# Patient Record
Sex: MALE | Race: WHITE | NOT HISPANIC OR LATINO | Employment: OTHER | URBAN - METROPOLITAN AREA
[De-identification: names, ages, dates, MRNs, and addresses within clinical notes are randomized per-mention and may not be internally consistent; named-entity substitution may affect disease eponyms.]

---

## 2023-04-21 PROBLEM — F41.9 ANXIETY: Status: ACTIVE | Noted: 2023-04-21

## 2023-04-21 PROBLEM — M80.08XA AGE-REL OSTEOPOR W CURRENT PATH FRACTURE, VERTEBRA(E), INIT (MULTI): Status: ACTIVE | Noted: 2023-04-21

## 2023-04-21 PROBLEM — N52.9 ERECTILE DYSFUNCTION ASSOCIATED WITH VASCULOPATHY: Status: ACTIVE | Noted: 2023-04-21

## 2023-04-21 PROBLEM — S09.90XA CLOSED HEAD INJURY: Status: ACTIVE | Noted: 2023-04-21

## 2023-04-21 PROBLEM — M54.41 ACUTE RIGHT-SIDED LOW BACK PAIN WITH RIGHT-SIDED SCIATICA: Status: ACTIVE | Noted: 2023-04-21

## 2023-04-21 PROBLEM — F17.200 TOBACCO USE DISORDER: Status: ACTIVE | Noted: 2023-04-21

## 2023-04-21 PROBLEM — R09.81 NASAL CONGESTION WITH RHINORRHEA: Status: ACTIVE | Noted: 2023-04-21

## 2023-04-21 PROBLEM — Z95.828 HISTORY OF REPAIR OF ANEURYSM OF ABDOMINAL AORTA USING ENDOVASCULAR STENT GRAFT: Status: ACTIVE | Noted: 2023-04-21

## 2023-04-21 PROBLEM — S32.020A COMPRESSION FRACTURE OF L2 LUMBAR VERTEBRA (MULTI): Status: ACTIVE | Noted: 2023-04-21

## 2023-04-21 PROBLEM — I71.40 AAA (ABDOMINAL AORTIC ANEURYSM) (CMS-HCC): Status: ACTIVE | Noted: 2023-04-21

## 2023-04-21 PROBLEM — I10 BENIGN ESSENTIAL HYPERTENSION: Status: ACTIVE | Noted: 2023-04-21

## 2023-04-21 PROBLEM — F17.210 NICOTINE DEPENDENCE, CIGARETTES, UNCOMPLICATED: Status: ACTIVE | Noted: 2023-04-21

## 2023-04-21 PROBLEM — Z95.5 PRESENCE OF STENT IN CORONARY ARTERY: Status: ACTIVE | Noted: 2023-04-21

## 2023-04-21 PROBLEM — K63.5 COLON POLYP: Status: ACTIVE | Noted: 2023-04-21

## 2023-04-21 PROBLEM — G25.0 BENIGN ESSENTIAL TREMOR: Status: ACTIVE | Noted: 2023-04-21

## 2023-04-21 PROBLEM — M54.9 BACK PAIN: Status: ACTIVE | Noted: 2023-04-21

## 2023-04-21 PROBLEM — J43.9 EMPHYSEMA LUNG (MULTI): Status: ACTIVE | Noted: 2023-04-21

## 2023-04-21 PROBLEM — J34.89 NASAL CONGESTION WITH RHINORRHEA: Status: ACTIVE | Noted: 2023-04-21

## 2023-04-21 PROBLEM — E78.5 HYPERLIPIDEMIA: Status: ACTIVE | Noted: 2023-04-21

## 2023-04-21 PROBLEM — M25.561 ACUTE PAIN OF RIGHT KNEE: Status: ACTIVE | Noted: 2023-04-21

## 2023-04-21 PROBLEM — E03.9 HYPOTHYROIDISM: Status: ACTIVE | Noted: 2023-04-21

## 2023-04-21 PROBLEM — S32.000D COMPRESSION FRACTURE OF LUMBAR VERTEBRA WITH ROUTINE HEALING: Status: ACTIVE | Noted: 2023-04-21

## 2023-04-21 PROBLEM — M17.11 OSTEOARTHRITIS OF RIGHT KNEE: Status: ACTIVE | Noted: 2023-04-21

## 2023-04-21 PROBLEM — E55.9 VITAMIN D DEFICIENCY: Status: ACTIVE | Noted: 2023-04-21

## 2023-04-21 PROBLEM — I25.10 ARTERIOSCLEROSIS OF CORONARY ARTERY: Status: ACTIVE | Noted: 2023-04-21

## 2023-04-21 PROBLEM — E11.9 NON-INSULIN DEPENDENT TYPE 2 DIABETES MELLITUS (MULTI): Status: ACTIVE | Noted: 2023-04-21

## 2023-04-21 PROBLEM — F32.9 MAJOR DEPRESSIVE DISORDER, SINGLE EPISODE: Status: ACTIVE | Noted: 2023-04-21

## 2023-04-21 PROBLEM — S12.390A: Status: ACTIVE | Noted: 2023-04-21

## 2023-04-21 PROBLEM — R29.6 RECURRENT FALLS: Status: ACTIVE | Noted: 2023-04-21

## 2023-04-21 PROBLEM — Z95.5 HISTORY OF HEART ARTERY STENT: Status: ACTIVE | Noted: 2023-04-21

## 2023-04-21 PROBLEM — R91.1 LUNG NODULE: Status: ACTIVE | Noted: 2023-04-21

## 2023-04-21 PROBLEM — E83.52 HYPERCALCEMIA: Status: ACTIVE | Noted: 2023-04-21

## 2023-04-21 PROBLEM — E78.1 HYPERTRIGLYCERIDEMIA: Status: ACTIVE | Noted: 2023-04-21

## 2023-04-21 RX ORDER — BUPROPION HYDROCHLORIDE 150 MG/1
1 TABLET ORAL DAILY
COMMUNITY
Start: 2021-04-13 | End: 2023-05-03 | Stop reason: SDUPTHER

## 2023-04-21 RX ORDER — BLOOD SUGAR DIAGNOSTIC
STRIP MISCELLANEOUS
COMMUNITY
Start: 2020-07-23 | End: 2023-11-14 | Stop reason: WASHOUT

## 2023-04-21 RX ORDER — METOPROLOL TARTRATE 25 MG/1
0.5 TABLET, FILM COATED ORAL 2 TIMES DAILY
COMMUNITY
Start: 2019-12-18 | End: 2023-05-03 | Stop reason: SDUPTHER

## 2023-04-21 RX ORDER — GABAPENTIN 100 MG/1
CAPSULE ORAL
COMMUNITY
End: 2023-05-03 | Stop reason: SDUPTHER

## 2023-04-21 RX ORDER — ATORVASTATIN CALCIUM 40 MG/1
1 TABLET, FILM COATED ORAL DAILY
COMMUNITY
Start: 2017-04-23 | End: 2023-05-03 | Stop reason: SDUPTHER

## 2023-04-21 RX ORDER — LEVOTHYROXINE SODIUM 25 UG/1
1 TABLET ORAL DAILY
COMMUNITY
Start: 2022-11-03 | End: 2023-05-03 | Stop reason: SDUPTHER

## 2023-04-21 RX ORDER — TRAZODONE HYDROCHLORIDE 100 MG/1
TABLET ORAL
COMMUNITY
Start: 2017-04-23 | End: 2023-05-03 | Stop reason: SDUPTHER

## 2023-04-21 RX ORDER — BLOOD SUGAR DIAGNOSTIC
1 STRIP MISCELLANEOUS DAILY
COMMUNITY
Start: 2020-07-23 | End: 2023-11-14 | Stop reason: WASHOUT

## 2023-04-21 RX ORDER — SERTRALINE HYDROCHLORIDE 100 MG/1
2 TABLET, FILM COATED ORAL DAILY
COMMUNITY
Start: 2017-04-23 | End: 2023-05-03 | Stop reason: SDUPTHER

## 2023-04-21 RX ORDER — ACETAMINOPHEN 500 MG
1 TABLET ORAL DAILY
COMMUNITY
Start: 2021-10-18 | End: 2023-05-03 | Stop reason: SDUPTHER

## 2023-04-21 RX ORDER — METFORMIN HYDROCHLORIDE 1000 MG/1
1 TABLET ORAL 2 TIMES DAILY
COMMUNITY
Start: 2019-11-12 | End: 2023-05-03 | Stop reason: SDUPTHER

## 2023-04-21 RX ORDER — NITROGLYCERIN 0.4 MG/1
0.4 TABLET SUBLINGUAL EVERY 5 MIN PRN
COMMUNITY
Start: 2017-04-23 | End: 2023-05-03 | Stop reason: SDUPTHER

## 2023-04-27 LAB
ALANINE AMINOTRANSFERASE (SGPT) (U/L) IN SER/PLAS: 19 U/L (ref 10–52)
ALBUMIN (G/DL) IN SER/PLAS: 4.5 G/DL (ref 3.4–5)
ALBUMIN (MG/L) IN URINE: <7 MG/L
ALBUMIN/CREATININE (UG/MG) IN URINE: NORMAL UG/MG CRT (ref 0–30)
ALKALINE PHOSPHATASE (U/L) IN SER/PLAS: 44 U/L (ref 33–136)
ANION GAP IN SER/PLAS: 11 MMOL/L (ref 10–20)
ASPARTATE AMINOTRANSFERASE (SGOT) (U/L) IN SER/PLAS: 18 U/L (ref 9–39)
BASOPHILS (10*3/UL) IN BLOOD BY AUTOMATED COUNT: 0.03 X10E9/L (ref 0–0.1)
BASOPHILS/100 LEUKOCYTES IN BLOOD BY AUTOMATED COUNT: 0.3 % (ref 0–2)
BILIRUBIN TOTAL (MG/DL) IN SER/PLAS: 0.5 MG/DL (ref 0–1.2)
CALCIDIOL (25 OH VITAMIN D3) (NG/ML) IN SER/PLAS: 33 NG/ML
CALCIUM (MG/DL) IN SER/PLAS: 10.2 MG/DL (ref 8.6–10.3)
CARBON DIOXIDE, TOTAL (MMOL/L) IN SER/PLAS: 27 MMOL/L (ref 21–32)
CHLORIDE (MMOL/L) IN SER/PLAS: 104 MMOL/L (ref 98–107)
CHOLESTEROL (MG/DL) IN SER/PLAS: 92 MG/DL (ref 0–199)
CHOLESTEROL IN HDL (MG/DL) IN SER/PLAS: 31.4 MG/DL
CHOLESTEROL/HDL RATIO: 2.9
COBALAMIN (VITAMIN B12) (PG/ML) IN SER/PLAS: 284 PG/ML (ref 211–911)
CREATININE (MG/DL) IN SER/PLAS: 1.16 MG/DL (ref 0.5–1.3)
CREATININE (MG/DL) IN URINE: 47.6 MG/DL (ref 20–370)
EOSINOPHILS (10*3/UL) IN BLOOD BY AUTOMATED COUNT: 0.18 X10E9/L (ref 0–0.7)
EOSINOPHILS/100 LEUKOCYTES IN BLOOD BY AUTOMATED COUNT: 1.9 % (ref 0–6)
ERYTHROCYTE DISTRIBUTION WIDTH (RATIO) BY AUTOMATED COUNT: 13.3 % (ref 11.5–14.5)
ERYTHROCYTE MEAN CORPUSCULAR HEMOGLOBIN CONCENTRATION (G/DL) BY AUTOMATED: 33.4 G/DL (ref 32–36)
ERYTHROCYTE MEAN CORPUSCULAR VOLUME (FL) BY AUTOMATED COUNT: 92 FL (ref 80–100)
ERYTHROCYTES (10*6/UL) IN BLOOD BY AUTOMATED COUNT: 4.54 X10E12/L (ref 4.5–5.9)
GFR MALE: 68 ML/MIN/1.73M2
GLUCOSE (MG/DL) IN SER/PLAS: 113 MG/DL (ref 74–99)
HEMATOCRIT (%) IN BLOOD BY AUTOMATED COUNT: 41.6 % (ref 41–52)
HEMOGLOBIN (G/DL) IN BLOOD: 13.9 G/DL (ref 13.5–17.5)
IMMATURE GRANULOCYTES/100 LEUKOCYTES IN BLOOD BY AUTOMATED COUNT: 0.5 % (ref 0–0.9)
LDL: 22 MG/DL (ref 0–99)
LEUKOCYTES (10*3/UL) IN BLOOD BY AUTOMATED COUNT: 9.4 X10E9/L (ref 4.4–11.3)
LYMPHOCYTES (10*3/UL) IN BLOOD BY AUTOMATED COUNT: 1.69 X10E9/L (ref 1.2–4.8)
LYMPHOCYTES/100 LEUKOCYTES IN BLOOD BY AUTOMATED COUNT: 18 % (ref 13–44)
MONOCYTES (10*3/UL) IN BLOOD BY AUTOMATED COUNT: 0.49 X10E9/L (ref 0.1–1)
MONOCYTES/100 LEUKOCYTES IN BLOOD BY AUTOMATED COUNT: 5.2 % (ref 2–10)
NEUTROPHILS (10*3/UL) IN BLOOD BY AUTOMATED COUNT: 6.94 X10E9/L (ref 1.2–7.7)
NEUTROPHILS/100 LEUKOCYTES IN BLOOD BY AUTOMATED COUNT: 74.1 % (ref 40–80)
PLATELETS (10*3/UL) IN BLOOD AUTOMATED COUNT: 150 X10E9/L (ref 150–450)
POTASSIUM (MMOL/L) IN SER/PLAS: 5 MMOL/L (ref 3.5–5.3)
PROTEIN TOTAL: 7.1 G/DL (ref 6.4–8.2)
SODIUM (MMOL/L) IN SER/PLAS: 137 MMOL/L (ref 136–145)
THYROTROPIN (MIU/L) IN SER/PLAS BY DETECTION LIMIT <= 0.05 MIU/L: 3.46 MIU/L (ref 0.44–3.98)
TRIGLYCERIDE (MG/DL) IN SER/PLAS: 195 MG/DL (ref 0–149)
UREA NITROGEN (MG/DL) IN SER/PLAS: 20 MG/DL (ref 6–23)
VLDL: 39 MG/DL (ref 0–40)

## 2023-04-28 LAB
ESTIMATED AVERAGE GLUCOSE FOR HBA1C: 123 MG/DL
HEMOGLOBIN A1C/HEMOGLOBIN TOTAL IN BLOOD: 5.9 %
POC CALCIUM IONIZED (MMOL/L) IN BLOOD: 1.37 MMOL/L (ref 1.1–1.33)

## 2023-05-03 ENCOUNTER — OFFICE VISIT (OUTPATIENT)
Dept: PRIMARY CARE | Facility: CLINIC | Age: 68
End: 2023-05-03
Payer: MEDICARE

## 2023-05-03 VITALS
TEMPERATURE: 96.8 F | DIASTOLIC BLOOD PRESSURE: 73 MMHG | OXYGEN SATURATION: 99 % | RESPIRATION RATE: 16 BRPM | SYSTOLIC BLOOD PRESSURE: 124 MMHG | BODY MASS INDEX: 27.77 KG/M2 | HEART RATE: 78 BPM | HEIGHT: 72 IN | WEIGHT: 205 LBS

## 2023-05-03 DIAGNOSIS — E83.52 HYPERCALCEMIA: ICD-10-CM

## 2023-05-03 DIAGNOSIS — E55.9 VITAMIN D DEFICIENCY: ICD-10-CM

## 2023-05-03 DIAGNOSIS — Z87.891 FORMER SMOKER: ICD-10-CM

## 2023-05-03 DIAGNOSIS — S32.020S COMPRESSION FRACTURE OF L2 VERTEBRA, SEQUELA: ICD-10-CM

## 2023-05-03 DIAGNOSIS — F17.200 TOBACCO DEPENDENCE: ICD-10-CM

## 2023-05-03 DIAGNOSIS — Z00.00 ROUTINE GENERAL MEDICAL EXAMINATION AT HEALTH CARE FACILITY: ICD-10-CM

## 2023-05-03 DIAGNOSIS — I71.40 ABDOMINAL AORTIC ANEURYSM (AAA), UNSPECIFIED PART, UNSPECIFIED WHETHER RUPTURED (CMS-HCC): Primary | ICD-10-CM

## 2023-05-03 DIAGNOSIS — F32.9 CURRENT EPISODE OF MAJOR DEPRESSIVE DISORDER WITHOUT PRIOR EPISODE, UNSPECIFIED DEPRESSION EPISODE SEVERITY: ICD-10-CM

## 2023-05-03 DIAGNOSIS — F17.200 TOBACCO USE DISORDER: ICD-10-CM

## 2023-05-03 DIAGNOSIS — E03.9 ACQUIRED HYPOTHYROIDISM: ICD-10-CM

## 2023-05-03 DIAGNOSIS — I10 BENIGN ESSENTIAL HYPERTENSION: ICD-10-CM

## 2023-05-03 DIAGNOSIS — E78.5 HYPERLIPIDEMIA, UNSPECIFIED HYPERLIPIDEMIA TYPE: ICD-10-CM

## 2023-05-03 DIAGNOSIS — I25.10 ARTERIOSCLEROSIS OF CORONARY ARTERY: ICD-10-CM

## 2023-05-03 DIAGNOSIS — F41.9 ANXIETY: ICD-10-CM

## 2023-05-03 DIAGNOSIS — Z12.2 ENCOUNTER FOR SCREENING FOR LUNG CANCER: ICD-10-CM

## 2023-05-03 DIAGNOSIS — J43.9 PULMONARY EMPHYSEMA, UNSPECIFIED EMPHYSEMA TYPE (MULTI): ICD-10-CM

## 2023-05-03 DIAGNOSIS — E11.9 NON-INSULIN DEPENDENT TYPE 2 DIABETES MELLITUS (MULTI): ICD-10-CM

## 2023-05-03 PROCEDURE — 3074F SYST BP LT 130 MM HG: CPT | Performed by: INTERNAL MEDICINE

## 2023-05-03 PROCEDURE — 1160F RVW MEDS BY RX/DR IN RCRD: CPT | Performed by: INTERNAL MEDICINE

## 2023-05-03 PROCEDURE — 3078F DIAST BP <80 MM HG: CPT | Performed by: INTERNAL MEDICINE

## 2023-05-03 PROCEDURE — 1159F MED LIST DOCD IN RCRD: CPT | Performed by: INTERNAL MEDICINE

## 2023-05-03 PROCEDURE — 1170F FXNL STATUS ASSESSED: CPT | Performed by: INTERNAL MEDICINE

## 2023-05-03 PROCEDURE — 4004F PT TOBACCO SCREEN RCVD TLK: CPT | Performed by: INTERNAL MEDICINE

## 2023-05-03 PROCEDURE — 99215 OFFICE O/P EST HI 40 MIN: CPT | Performed by: INTERNAL MEDICINE

## 2023-05-03 PROCEDURE — G0439 PPPS, SUBSEQ VISIT: HCPCS | Performed by: INTERNAL MEDICINE

## 2023-05-03 PROCEDURE — 3044F HG A1C LEVEL LT 7.0%: CPT | Performed by: INTERNAL MEDICINE

## 2023-05-03 RX ORDER — LEVOTHYROXINE SODIUM 25 UG/1
25 TABLET ORAL DAILY
Qty: 90 TABLET | Refills: 3 | Status: SHIPPED | OUTPATIENT
Start: 2023-05-03 | End: 2024-05-09 | Stop reason: SDUPTHER

## 2023-05-03 RX ORDER — ATORVASTATIN CALCIUM 40 MG/1
40 TABLET, FILM COATED ORAL DAILY
Qty: 90 TABLET | Refills: 3 | Status: SHIPPED | OUTPATIENT
Start: 2023-05-03 | End: 2024-04-11 | Stop reason: SDUPTHER

## 2023-05-03 RX ORDER — NITROGLYCERIN 0.4 MG/1
0.4 TABLET SUBLINGUAL EVERY 5 MIN PRN
Qty: 90 TABLET | Refills: 0 | Status: SHIPPED | OUTPATIENT
Start: 2023-05-03 | End: 2023-11-09 | Stop reason: SDUPTHER

## 2023-05-03 RX ORDER — GABAPENTIN 100 MG/1
100 CAPSULE ORAL NIGHTLY
Qty: 90 CAPSULE | Refills: 3 | Status: SHIPPED | OUTPATIENT
Start: 2023-05-03 | End: 2024-05-09 | Stop reason: DRUGHIGH

## 2023-05-03 RX ORDER — TRAZODONE HYDROCHLORIDE 100 MG/1
100 TABLET ORAL NIGHTLY
Qty: 90 TABLET | Refills: 3 | Status: SHIPPED | OUTPATIENT
Start: 2023-05-03

## 2023-05-03 RX ORDER — GABAPENTIN 300 MG/1
300 CAPSULE ORAL NIGHTLY
Qty: 90 CAPSULE | Refills: 3 | Status: SHIPPED | OUTPATIENT
Start: 2023-05-03

## 2023-05-03 RX ORDER — NAPROXEN SODIUM 220 MG/1
2 TABLET ORAL 2 TIMES DAILY
COMMUNITY
Start: 2020-12-04

## 2023-05-03 RX ORDER — GABAPENTIN 300 MG/1
1 CAPSULE ORAL NIGHTLY
COMMUNITY
Start: 2017-04-25 | End: 2023-05-03 | Stop reason: SDUPTHER

## 2023-05-03 RX ORDER — ACETAMINOPHEN 500 MG
50 TABLET ORAL DAILY
Qty: 90 CAPSULE | Refills: 3 | Status: SHIPPED | OUTPATIENT
Start: 2023-05-03

## 2023-05-03 RX ORDER — METOPROLOL TARTRATE 25 MG/1
12.5 TABLET, FILM COATED ORAL 2 TIMES DAILY
Qty: 90 TABLET | Refills: 3 | Status: SHIPPED | OUTPATIENT
Start: 2023-05-03 | End: 2024-04-11 | Stop reason: SDUPTHER

## 2023-05-03 RX ORDER — BUPROPION HYDROCHLORIDE 150 MG/1
150 TABLET ORAL DAILY
Qty: 90 TABLET | Refills: 3 | Status: SHIPPED | OUTPATIENT
Start: 2023-05-03

## 2023-05-03 RX ORDER — SERTRALINE HYDROCHLORIDE 100 MG/1
200 TABLET, FILM COATED ORAL DAILY
Qty: 90 TABLET | Refills: 3 | Status: SHIPPED | OUTPATIENT
Start: 2023-05-03

## 2023-05-03 RX ORDER — METFORMIN HYDROCHLORIDE 1000 MG/1
1000 TABLET ORAL 2 TIMES DAILY
Qty: 90 TABLET | Refills: 3 | Status: SHIPPED | OUTPATIENT
Start: 2023-05-03 | End: 2023-05-11

## 2023-05-03 SDOH — ECONOMIC STABILITY: FOOD INSECURITY: WITHIN THE PAST 12 MONTHS, THE FOOD YOU BOUGHT JUST DIDN'T LAST AND YOU DIDN'T HAVE MONEY TO GET MORE.: NEVER TRUE

## 2023-05-03 SDOH — ECONOMIC STABILITY: FOOD INSECURITY: WITHIN THE PAST 12 MONTHS, YOU WORRIED THAT YOUR FOOD WOULD RUN OUT BEFORE YOU GOT MONEY TO BUY MORE.: NEVER TRUE

## 2023-05-03 ASSESSMENT — LIFESTYLE VARIABLES
HOW OFTEN DO YOU HAVE A DRINK CONTAINING ALCOHOL: NEVER
HOW OFTEN DO YOU HAVE SIX OR MORE DRINKS ON ONE OCCASION: NEVER
AUDIT-C TOTAL SCORE: 0
HOW MANY STANDARD DRINKS CONTAINING ALCOHOL DO YOU HAVE ON A TYPICAL DAY: PATIENT DOES NOT DRINK
SKIP TO QUESTIONS 9-10: 1

## 2023-05-03 ASSESSMENT — ACTIVITIES OF DAILY LIVING (ADL)
TAKING_MEDICATION: INDEPENDENT
MANAGING_FINANCES: INDEPENDENT
DRESSING: INDEPENDENT
GROCERY_SHOPPING: INDEPENDENT
BATHING: INDEPENDENT
DOING_HOUSEWORK: INDEPENDENT

## 2023-05-03 ASSESSMENT — ENCOUNTER SYMPTOMS
DEPRESSION: 1
LOSS OF SENSATION IN FEET: 0
OCCASIONAL FEELINGS OF UNSTEADINESS: 0

## 2023-05-03 ASSESSMENT — PATIENT HEALTH QUESTIONNAIRE - PHQ9
1. LITTLE INTEREST OR PLEASURE IN DOING THINGS: NOT AT ALL
SUM OF ALL RESPONSES TO PHQ9 QUESTIONS 1 & 2: 0
2. FEELING DOWN, DEPRESSED OR HOPELESS: NOT AT ALL

## 2023-05-03 ASSESSMENT — PAIN SCALES - GENERAL: PAINLEVEL: 0-NO PAIN

## 2023-05-03 NOTE — ASSESSMENT & PLAN NOTE
He had repair of AAA, patient states that he never heard from surgeon for follow up, will refer to vascular surgery locally for follow up

## 2023-05-03 NOTE — PROGRESS NOTES
"Subjective   Reason for Visit: Bola Marrufo is an 67 y.o. male here for a Medicare Wellness visit.     Past Medical, Surgical, and Family History reviewed and updated in chart.    Reviewed all medications by prescribing practitioner or clinical pharmacist (such as prescriptions, OTCs, herbal therapies and supplements) and documented in the medical record.    HPI    follow up and Medicare Wellness exam     Patient lives at [facility] in [place], [place], [facility] came to the facility , he received Garcia COVID vaccine x 2, he received his booster vaccines x 2 also     lung nodule: sees pulmonary medicine, CT of the lung was completed in 12/20221, he used to see Dr Mcneal, he does not want to follow up in [place], it is too far to drive. Patient would like to follow up with a local pulmonologist. He is due for a follow up screening CT of the chest also       smoker: has decreased smoking to less than 1/2 ppd, he continues to smoke less, he only smokes now when he goes outside. [employer] inspected his apartment facility, no smoking is allowed inside, he tried Chantix , he had bad dreams, it was prescribed by cardiology. He did not want to have bad dreams     depression: patient takes sertraline 200 mg daily, he also takes Trazodone and Welbutrin, patient is seen at the [facility]. Patient is able to sleep better at night,  now . Patient has not seen grandchildren, son now lives with patient's ex wife (son's mother), he sees little of his grandchildren now, \"I've kind of accepted it\"     low back pain: patient has seen Dr Castro.,  It has interrupted exercise routine, patient had kyphoplasty completed, which did help a great deal, he notes that the radiation of the pain has improved     Patient has right knee pain, \"we can't do anything there\", the right knee \"keeps going out\", he is not currently seeing an orthopedic physician, patient states that he is \"getting weaker\"     hypercalcemia: noted on " "evaluation, ionized calcium is elevated, PTH is normal     The patient states he has been stable with his Type II Diabetes control since the last visit.   He takes metformin twice daily.     The patient is being seen for a routine clinic follow-up of hyperlipidemia. Current treatment includes atorvastatin, and fish oil.      The patient presents for follow-up of essential hypertension, he takes metoprolol   Symptoms:   Additional History: takes metoprolol only.   Patient Care Team:  Jarett Nicole MD as PCP - General  Jarett Nicole MD as PCP - [employer] PCP     Review of Systems    Constitutional: he has lost some weight, diet has not been great due to cost, he tries to maintain a healthy diet, but not feeling poorly, no fever, no recent weight gain, no recent weight loss and no chills.   Eyes: blurred vision and sees Dr Danielson, he canceled surgery due to COVID, he will schedule surgery when he feels comfortable, but no diplopia and no eyesight problems.   ENT: hearing loss and he has a slight hearing loss, but no tinnitus and no earache.   Cardiovascular: no chest pain, no tightness or heavy pressure, no shortness of breath and no palpitations.   Respiratory: smokes a little less, he is trying to cut down again, sees pulmonary medicine, Dr Mcneal, he would like to see a local pulmonologist however  Gastrointestinal: Dr Santoro did colonoscopy 3 years ago, a couple of polyps were removed, he is to follow up with Dr Santoro, he has heartburn after eating, it seems to subside after lying down,  but no change in bowel habits, no diarrhea, no constipation, no bloody stools, no nausea and no vomiting.   Genitourinary: urinary frequency, nocturia and urination is not a problem per the patient, had PSA checked, but no dysuria, no hematuria and no burning sensation during urination.   Musculoskeletal: arthralgias, back pain and compression fracture of L spine after falling off of a ladder \"gracefully\", " "had Kyphoplasty completed, has some knee issues also, he has not been very steady., but as noted in HPI.   Neurological: he has resting tremor, it has gotten worse, he saw neurology in the past, he has seasonal headaches, no dizziness, no seizures, no tingling and no numbness.   Psychiatric: depression, sleep disturbances and memory loss may be mild he notes, goes to [facility] for treatment of depressive disorder, but no memory lapses or loss and as noted in HPI. He takes trazodone, sertraline and bupropion  Endocrine: diabetes mellitus, but no thyroid disorder.      Objective   Vitals:  /73 (BP Location: Left arm, Patient Position: Sitting, BP Cuff Size: Adult)   Pulse 78   Temp 36 °C (96.8 °F) (Temporal)   Resp 16   Ht 1.816 m (5' 11.5\")   Wt 93 kg (205 lb)   SpO2 99%   BMI 28.19 kg/m²       Physical Exam    Constitutional   General appearance: Alert and in no acute distress. well developed, well nourished, within normal limits of ideal weight and wearing a mask. Pleasant male, appears to be in no acute distress, he is sitting in chair   Eyes   Inspection of eyes: Sclera and conjunctiva were normal.   Ears, Nose, Mouth, and Throat   Ears: Auricles: Normal.   Pulmonary   Respiratory assessment: No respiratory distress, normal respiratory rhythm and effort.    Auscultation of lungs:  Auscultation of the lungs revealed decreased breath sounds diffusely, a few inspiratory wheezes and rhonchi noted diffusely. no rales or crackles were heard bilaterally.  diminished breath sounds bilaterally. no bronchial breath sounds.   Cardiovascular   Auscultation of heart: Apical pulse normal, heart rate and rhythm normal, normal S1 and S2, no murmurs and no pericardial rub. The heart rate was normal. The rhythm was regular. Heart sounds: the heart sounds were distant, but normal S1 and normal S2. no murmurs were heard.    Exam for edema: No peripheral edema.   Lymphatic   Palpation of lymph nodes in neck: No " cervical lymphadenopathy.   Neurologic   Cranial nerves: Nerves 2-12 were intact, no focal neuro defects. wearing a mask, significant resting tremor of both hands is noted. This has not changed.  Psychiatric   Orientation: Oriented to person, place, and time.    Mood and affect: Normal. he is pleasant.   Assessment/Plan   Problem List Items Addressed This Visit          Respiratory    Emphysema lung (CMS/HCC)    Current Assessment & Plan     Will refer to Dr Perdue locally for pulmonary follow up, encourage the patient again to quit smoking, no change in regimen today, will order follow up  CT of the lungs to re evaluate the lung nodule         Relevant Orders    Referral to Pulmonology    Comprehensive Metabolic Panel       Circulatory    AAA (abdominal aortic aneurysm) (CMS/HCC) - Primary    Current Assessment & Plan     He had repair of AAA, patient states that he never heard from surgeon for follow up, will refer to vascular surgery locally for follow up         Relevant Medications    nitroglycerin (Nitrostat) 0.4 mg SL tablet    Other Relevant Orders    Comprehensive Metabolic Panel    Referral to Vascular Surgery    Arteriosclerosis of coronary artery    Relevant Medications    metoprolol tartrate (Lopressor) 25 mg tablet    nitroglycerin (Nitrostat) 0.4 mg SL tablet    Other Relevant Orders    Comprehensive Metabolic Panel    Benign essential hypertension    Current Assessment & Plan     Stable today, no med changes         Relevant Medications    metoprolol tartrate (Lopressor) 25 mg tablet    Other Relevant Orders    Comprehensive Metabolic Panel       Musculoskeletal    Compression fracture of L2 lumbar vertebra (CMS/[facility])    Current Assessment & Plan     He is clinically stable, no changes today         Relevant Orders    Comprehensive Metabolic Panel       Endocrine/Metabolic    Hypothyroidism    Current Assessment & Plan     Stable, continue to monitor         Relevant Medications    levothyroxine  (Synthroid, Levoxyl) 25 mcg tablet    Other Relevant Orders    Referral to Endocrinology    TSH with reflex to Free T4 if abnormal    Comprehensive Metabolic Panel    Non-insulin dependent type 2 diabetes mellitus (CMS/HCC)    Current Assessment & Plan     Glucoses are controlled, no med changes, continue to monitor         Relevant Medications    gabapentin (Neurontin) 100 mg capsule    gabapentin (Neurontin) 300 mg capsule    metFORMIN (Glucophage) 1,000 mg tablet    Other Relevant Orders    Referral to Endocrinology    Hemoglobin A1C    Albumin , Urine Random    Comprehensive Metabolic Panel    Vitamin D deficiency    Relevant Orders    Vitamin D 1,25 Dihydroxy    Comprehensive Metabolic Panel       Other    Anxiety    Relevant Medications    buPROPion XL (Wellbutrin XL) 150 mg 24 hr tablet    Other Relevant Orders    Comprehensive Metabolic Panel    Hypercalcemia    Current Assessment & Plan     Ionized calcium remains slightly elevated, has had multiple compression fractures, will refer to endocrinology for assessment, will continue to monitor lung nodule also         Relevant Orders    Referral to Endocrinology    Comprehensive Metabolic Panel    Calcium, ionized    Hyperlipidemia    Current Assessment & Plan     Stable at present, no med changes         Relevant Medications    atorvastatin (Lipitor) 40 mg tablet    sertraline (Zoloft) 100 mg tablet    Other Relevant Orders    Comprehensive Metabolic Panel    Major depressive disorder, single episode    Current Assessment & Plan     Follow up with [facility]          Relevant Medications    buPROPion XL (Wellbutrin XL) 150 mg 24 hr tablet    cholecalciferol (Vitamin D-3) 50 mcg (2,000 unit) capsule    traZODone (Desyrel) 100 mg tablet    Other Relevant Orders    Comprehensive Metabolic Panel    Tobacco use disorder    Current Assessment & Plan     Strongly advise smoking cessation again         Relevant Orders    Comprehensive Metabolic Panel    Routine  general medical examination at health care facility    Current Assessment & Plan     Hepatitis C ab screening, recheck CT chest, recheck routine labs in 6 months, encourage smoking cessation         Relevant Orders    Hepatitis C antibody    Comprehensive Metabolic Panel     Other Visit Diagnoses       Tobacco dependence        Relevant Orders    Referral to Pulmonology    CT lung screening low dose    Comprehensive Metabolic Panel    Former smoker        Relevant Orders    CT lung screening low dose    Comprehensive Metabolic Panel    Encounter for screening for lung cancer        Relevant Orders    Referral to Pulmonology    CT lung screening low dose    Comprehensive Metabolic Panel        Medicare wellness exam is completed, follow up with specialists as ordered  Follow up here in 6 months, check labs prior to next visit

## 2023-05-03 NOTE — ASSESSMENT & PLAN NOTE
Ionized calcium remains slightly elevated, has had multiple compression fractures, will refer to endocrinology for assessment, will continue to monitor lung nodule also

## 2023-05-03 NOTE — ASSESSMENT & PLAN NOTE
Will refer to Dr Perdue locally for pulmonary follow up, encourage the patient again to quit smoking, no change in regimen today, will order follow up  CT of the lungs to re evaluate the lung nodule

## 2023-05-03 NOTE — ASSESSMENT & PLAN NOTE
Hepatitis C ab screening, recheck CT chest, recheck routine labs in 6 months, encourage smoking cessation

## 2023-05-08 ENCOUNTER — LAB (OUTPATIENT)
Dept: LAB | Facility: LAB | Age: 68
End: 2023-05-08
Payer: MEDICARE

## 2023-05-08 DIAGNOSIS — Z00.00 ROUTINE GENERAL MEDICAL EXAMINATION AT HEALTH CARE FACILITY: ICD-10-CM

## 2023-05-08 LAB — HEPATITIS C VIRUS AB PRESENCE IN SERUM: NONREACTIVE

## 2023-05-08 PROCEDURE — 86803 HEPATITIS C AB TEST: CPT

## 2023-05-08 PROCEDURE — 36415 COLL VENOUS BLD VENIPUNCTURE: CPT

## 2023-05-11 DIAGNOSIS — E11.9 NON-INSULIN DEPENDENT TYPE 2 DIABETES MELLITUS (MULTI): ICD-10-CM

## 2023-05-11 RX ORDER — CLONAZEPAM 1 MG/1
1 TABLET ORAL AS NEEDED
COMMUNITY

## 2023-05-11 RX ORDER — ARIPIPRAZOLE 2 MG/1
TABLET ORAL EVERY 24 HOURS
COMMUNITY
End: 2023-11-14 | Stop reason: WASHOUT

## 2023-05-11 RX ORDER — HYDROXYZINE PAMOATE 25 MG/1
1 CAPSULE ORAL EVERY 8 HOURS
COMMUNITY
End: 2023-11-14 | Stop reason: WASHOUT

## 2023-05-11 RX ORDER — METFORMIN HYDROCHLORIDE 1000 MG/1
TABLET ORAL
Qty: 60 TABLET | Refills: 0 | Status: SHIPPED | OUTPATIENT
Start: 2023-05-11 | End: 2023-09-14

## 2023-06-19 LAB
ALBUMIN (G/DL) IN SER/PLAS: 4.8 G/DL (ref 3.4–5)
ANION GAP IN SER/PLAS: 10 MMOL/L (ref 10–20)
CALCIUM (MG/DL) IN SER/PLAS: 10.3 MG/DL (ref 8.6–10.3)
CARBON DIOXIDE, TOTAL (MMOL/L) IN SER/PLAS: 25 MMOL/L (ref 21–32)
CHLORIDE (MMOL/L) IN SER/PLAS: 106 MMOL/L (ref 98–107)
CREATININE (MG/DL) IN SER/PLAS: 1.26 MG/DL (ref 0.5–1.3)
GFR MALE: 62 ML/MIN/1.73M2
GLUCOSE (MG/DL) IN SER/PLAS: 104 MG/DL (ref 74–99)
PHOSPHATE (MG/DL) IN SER/PLAS: 3 MG/DL (ref 2.5–4.9)
POTASSIUM (MMOL/L) IN SER/PLAS: 4.3 MMOL/L (ref 3.5–5.3)
SODIUM (MMOL/L) IN SER/PLAS: 137 MMOL/L (ref 136–145)
UREA NITROGEN (MG/DL) IN SER/PLAS: 20 MG/DL (ref 6–23)

## 2023-09-13 DIAGNOSIS — E11.9 NON-INSULIN DEPENDENT TYPE 2 DIABETES MELLITUS (MULTI): ICD-10-CM

## 2023-09-14 RX ORDER — METFORMIN HYDROCHLORIDE 1000 MG/1
1000 TABLET ORAL 2 TIMES DAILY
Qty: 60 TABLET | Refills: 0 | Status: SHIPPED | OUTPATIENT
Start: 2023-09-14 | End: 2023-10-12

## 2023-10-12 DIAGNOSIS — E11.9 NON-INSULIN DEPENDENT TYPE 2 DIABETES MELLITUS (MULTI): ICD-10-CM

## 2023-10-12 PROBLEM — J44.9 COPD (CHRONIC OBSTRUCTIVE PULMONARY DISEASE) (MULTI): Status: ACTIVE | Noted: 2023-10-12

## 2023-10-12 PROBLEM — F17.210 CIGARETTE SMOKER: Status: ACTIVE | Noted: 2023-10-12

## 2023-10-12 RX ORDER — METFORMIN HYDROCHLORIDE 1000 MG/1
1000 TABLET ORAL 2 TIMES DAILY
Qty: 60 TABLET | Refills: 3 | Status: SHIPPED | OUTPATIENT
Start: 2023-10-12 | End: 2024-01-02

## 2023-11-02 ENCOUNTER — LAB (OUTPATIENT)
Dept: LAB | Facility: LAB | Age: 68
End: 2023-11-02
Payer: MEDICARE

## 2023-11-02 DIAGNOSIS — J43.9 PULMONARY EMPHYSEMA, UNSPECIFIED EMPHYSEMA TYPE (MULTI): ICD-10-CM

## 2023-11-02 DIAGNOSIS — E55.9 VITAMIN D DEFICIENCY: ICD-10-CM

## 2023-11-02 DIAGNOSIS — Z00.00 ROUTINE GENERAL MEDICAL EXAMINATION AT HEALTH CARE FACILITY: ICD-10-CM

## 2023-11-02 DIAGNOSIS — I10 BENIGN ESSENTIAL HYPERTENSION: ICD-10-CM

## 2023-11-02 DIAGNOSIS — F17.200 TOBACCO DEPENDENCE: ICD-10-CM

## 2023-11-02 DIAGNOSIS — E03.9 ACQUIRED HYPOTHYROIDISM: ICD-10-CM

## 2023-11-02 DIAGNOSIS — S32.020S COMPRESSION FRACTURE OF L2 VERTEBRA, SEQUELA: ICD-10-CM

## 2023-11-02 DIAGNOSIS — Z12.2 ENCOUNTER FOR SCREENING FOR LUNG CANCER: ICD-10-CM

## 2023-11-02 DIAGNOSIS — E11.9 NON-INSULIN DEPENDENT TYPE 2 DIABETES MELLITUS (MULTI): ICD-10-CM

## 2023-11-02 DIAGNOSIS — I71.40 ABDOMINAL AORTIC ANEURYSM (AAA), UNSPECIFIED PART, UNSPECIFIED WHETHER RUPTURED (CMS-HCC): ICD-10-CM

## 2023-11-02 DIAGNOSIS — Z87.891 FORMER SMOKER: ICD-10-CM

## 2023-11-02 DIAGNOSIS — E78.5 HYPERLIPIDEMIA, UNSPECIFIED HYPERLIPIDEMIA TYPE: ICD-10-CM

## 2023-11-02 DIAGNOSIS — F17.200 TOBACCO USE DISORDER: ICD-10-CM

## 2023-11-02 DIAGNOSIS — E83.52 HYPERCALCEMIA: ICD-10-CM

## 2023-11-02 DIAGNOSIS — I25.10 ARTERIOSCLEROSIS OF CORONARY ARTERY: ICD-10-CM

## 2023-11-02 DIAGNOSIS — F32.9 CURRENT EPISODE OF MAJOR DEPRESSIVE DISORDER WITHOUT PRIOR EPISODE, UNSPECIFIED DEPRESSION EPISODE SEVERITY: ICD-10-CM

## 2023-11-02 DIAGNOSIS — F41.9 ANXIETY: ICD-10-CM

## 2023-11-02 LAB
ALBUMIN SERPL BCP-MCNC: 4.7 G/DL (ref 3.4–5)
ALP SERPL-CCNC: 52 U/L (ref 33–136)
ALT SERPL W P-5'-P-CCNC: 21 U/L (ref 10–52)
ANION GAP SERPL CALC-SCNC: 10 MMOL/L (ref 10–20)
AST SERPL W P-5'-P-CCNC: 22 U/L (ref 9–39)
BILIRUB SERPL-MCNC: 0.4 MG/DL (ref 0–1.2)
BUN SERPL-MCNC: 19 MG/DL (ref 6–23)
CA-I BLD-SCNC: 1.35 MMOL/L (ref 1.1–1.33)
CALCIUM SERPL-MCNC: 10.7 MG/DL (ref 8.6–10.3)
CHLORIDE SERPL-SCNC: 104 MMOL/L (ref 98–107)
CO2 SERPL-SCNC: 27 MMOL/L (ref 21–32)
CREAT SERPL-MCNC: 1.31 MG/DL (ref 0.5–1.3)
CREAT UR-MCNC: 60.8 MG/DL (ref 20–370)
EST. AVERAGE GLUCOSE BLD GHB EST-MCNC: 120 MG/DL
GFR SERPL CREATININE-BSD FRML MDRD: 59 ML/MIN/1.73M*2
GLUCOSE SERPL-MCNC: 107 MG/DL (ref 74–99)
HBA1C MFR BLD: 5.8 %
MICROALBUMIN UR-MCNC: 8.4 MG/L
MICROALBUMIN/CREAT UR: 13.8 UG/MG CREAT
POTASSIUM SERPL-SCNC: 4.5 MMOL/L (ref 3.5–5.3)
PROT SERPL-MCNC: 7 G/DL (ref 6.4–8.2)
SODIUM SERPL-SCNC: 136 MMOL/L (ref 136–145)
TSH SERPL-ACNC: 3.85 MIU/L (ref 0.44–3.98)

## 2023-11-02 PROCEDURE — 84443 ASSAY THYROID STIM HORMONE: CPT

## 2023-11-02 PROCEDURE — 82043 UR ALBUMIN QUANTITATIVE: CPT

## 2023-11-02 PROCEDURE — 82570 ASSAY OF URINE CREATININE: CPT

## 2023-11-02 PROCEDURE — 36415 COLL VENOUS BLD VENIPUNCTURE: CPT

## 2023-11-02 PROCEDURE — 82652 VIT D 1 25-DIHYDROXY: CPT

## 2023-11-02 PROCEDURE — 80053 COMPREHEN METABOLIC PANEL: CPT

## 2023-11-02 PROCEDURE — 82330 ASSAY OF CALCIUM: CPT

## 2023-11-02 PROCEDURE — 83036 HEMOGLOBIN GLYCOSYLATED A1C: CPT

## 2023-11-05 LAB — 1,25(OH)2D3 SERPL-MCNC: 29.4 PG/ML (ref 19.9–79.3)

## 2023-11-09 ENCOUNTER — OFFICE VISIT (OUTPATIENT)
Dept: PRIMARY CARE | Facility: CLINIC | Age: 68
End: 2023-11-09
Payer: MEDICARE

## 2023-11-09 VITALS
HEIGHT: 72 IN | HEART RATE: 58 BPM | BODY MASS INDEX: 26.82 KG/M2 | WEIGHT: 198 LBS | SYSTOLIC BLOOD PRESSURE: 120 MMHG | OXYGEN SATURATION: 96 % | TEMPERATURE: 97.6 F | DIASTOLIC BLOOD PRESSURE: 80 MMHG

## 2023-11-09 DIAGNOSIS — F32.5 MAJOR DEPRESSIVE DISORDER WITH SINGLE EPISODE, IN REMISSION (CMS-HCC): ICD-10-CM

## 2023-11-09 DIAGNOSIS — E78.2 MIXED HYPERLIPIDEMIA: ICD-10-CM

## 2023-11-09 DIAGNOSIS — J43.9 PULMONARY EMPHYSEMA, UNSPECIFIED EMPHYSEMA TYPE (MULTI): ICD-10-CM

## 2023-11-09 DIAGNOSIS — I10 BENIGN ESSENTIAL HYPERTENSION: Primary | ICD-10-CM

## 2023-11-09 DIAGNOSIS — E55.9 VITAMIN D DEFICIENCY: ICD-10-CM

## 2023-11-09 DIAGNOSIS — E11.9 NON-INSULIN DEPENDENT TYPE 2 DIABETES MELLITUS (MULTI): ICD-10-CM

## 2023-11-09 DIAGNOSIS — D73.4: ICD-10-CM

## 2023-11-09 DIAGNOSIS — F17.210 CIGARETTE SMOKER: ICD-10-CM

## 2023-11-09 DIAGNOSIS — N40.0 PROSTATE ENLARGEMENT: ICD-10-CM

## 2023-11-09 DIAGNOSIS — N18.31 STAGE 3A CHRONIC KIDNEY DISEASE (MULTI): ICD-10-CM

## 2023-11-09 DIAGNOSIS — E83.52 HYPERCALCEMIA: ICD-10-CM

## 2023-11-09 DIAGNOSIS — I71.40 ABDOMINAL AORTIC ANEURYSM (AAA), UNSPECIFIED PART, UNSPECIFIED WHETHER RUPTURED (CMS-HCC): ICD-10-CM

## 2023-11-09 DIAGNOSIS — E03.9 ACQUIRED HYPOTHYROIDISM: ICD-10-CM

## 2023-11-09 PROCEDURE — 1159F MED LIST DOCD IN RCRD: CPT | Performed by: INTERNAL MEDICINE

## 2023-11-09 PROCEDURE — 99215 OFFICE O/P EST HI 40 MIN: CPT | Performed by: INTERNAL MEDICINE

## 2023-11-09 PROCEDURE — 3074F SYST BP LT 130 MM HG: CPT | Performed by: INTERNAL MEDICINE

## 2023-11-09 PROCEDURE — 3079F DIAST BP 80-89 MM HG: CPT | Performed by: INTERNAL MEDICINE

## 2023-11-09 PROCEDURE — 3061F NEG MICROALBUMINURIA REV: CPT | Performed by: INTERNAL MEDICINE

## 2023-11-09 PROCEDURE — 1160F RVW MEDS BY RX/DR IN RCRD: CPT | Performed by: INTERNAL MEDICINE

## 2023-11-09 PROCEDURE — 1126F AMNT PAIN NOTED NONE PRSNT: CPT | Performed by: INTERNAL MEDICINE

## 2023-11-09 PROCEDURE — 4004F PT TOBACCO SCREEN RCVD TLK: CPT | Performed by: INTERNAL MEDICINE

## 2023-11-09 PROCEDURE — 3044F HG A1C LEVEL LT 7.0%: CPT | Performed by: INTERNAL MEDICINE

## 2023-11-09 RX ORDER — CLONAZEPAM 1 MG/1
TABLET ORAL EVERY 24 HOURS
Status: CANCELLED | OUTPATIENT
Start: 2023-11-09

## 2023-11-09 RX ORDER — NITROGLYCERIN 0.4 MG/1
0.4 TABLET SUBLINGUAL EVERY 5 MIN PRN
Qty: 90 TABLET | Refills: 0 | Status: SHIPPED | OUTPATIENT
Start: 2023-11-09

## 2023-11-09 NOTE — PROGRESS NOTES
"Chief Complaint/HPI:  lung nodule: sees pulmonary medicine, he now sees pulmonary medicine locally, he had PFTs completed, he continues to smoke     Aneurysm evaluation: he had aneurysm checked, study noted that prostate has enlarged.      smoker: has decreased smoking to less than 1/2 ppd, he continues to smoke less, he only smokes now when he goes outside. [employer] inspected his apartment facility, no smoking is allowed inside, he tried Chantix , he had bad dreams, it was prescribed by cardiology. He did not want to have bad dreams. Patient usually smokes when he walks his dog or drinks his coffee.      depression: patient takes sertraline 200 mg daily, he also takes Trazodone and Welbutrin, patient is seen at the [facility]. Patient is able to sleep of and on now . Patient has not seen grandchildren, son now lives with patient's ex wife (son's mother), he sees little of his grandchildren now, \"I've kind of accepted it\". He takes clonazepam for anxiety issues, I have personally reviewed the OARRS report.  This report is scanned into the electronic medical record.  I have considered the risks of abuse, dependence, addiction and diversion.  I believe that it is clinically appropriate to prescribe this medication. He does have some dreaming at night, he wakes up sweating at times. Jarett Nicole MD        low back pain: patient has seen Dr Castro.,  It has interrupted exercise routine, patient had kyphoplasty completed, which did help a great deal, he notes that the radiation of the pain has improved, he still has back pain on certain days, \"but I can handle it\"     Patient has right knee pain, \"we can't do anything there\", the right knee \"keeps going out\", he is not currently seeing an orthopedic physician, patient states that he is \"getting weaker\". He does not want to be evaluated now     hypercalcemia: noted on evaluation, ionized calcium is elevated, PTH is normal     Type II Diabetes control since the " last visit. He takes metformin twice daily.      hyperlipidemia. Current treatment includes atorvastatin, and fish oil.      essential hypertension, he takes metoprolol     Vitamin d deficiency: he takes vitamin d       ROS otherwise negative aside from what was mentioned above in HPI.      Patient Active Problem List   Diagnosis    AAA (abdominal aortic aneurysm) (CMS/HCC)    Acute pain of right knee    Acute right-sided low back pain with right-sided sciatica    Back pain    Age-rel osteopor w current path fracture, vertebra(e), init (CMS/HCC)    Anxiety    Arteriosclerosis of coronary artery    Benign essential hypertension    Benign essential tremor    Closed head injury    Colon polyp    Compression fracture of fourth cervical vertebra (CMS/HCC)    Compression fracture of L2 lumbar vertebra (CMS/HCC)    Compression fracture of lumbar vertebra with routine healing    Emphysema lung (CMS/HCC)    Erectile dysfunction associated with vasculopathy    History of heart artery stent    History of repair of aneurysm of abdominal aorta using endovascular stent graft    Hypercalcemia    Hyperlipidemia    Hypertriglyceridemia    Hypothyroidism    Lung nodule    Major depressive disorder, single episode    Nasal congestion with rhinorrhea    Nicotine dependence, cigarettes, uncomplicated    Non-insulin dependent type 2 diabetes mellitus (CMS/HCC)    Osteoarthritis of right knee    Presence of stent in coronary artery    Recurrent falls    Tobacco use disorder    Vitamin D deficiency    Routine general medical examination at health care facility    Cigarette smoker    COPD (chronic obstructive pulmonary disease) (CMS/HCC)    Stage 3a chronic kidney disease (CMS/HCC)    Acquired splenic cyst    Prostate enlargement         Past Medical History:   Diagnosis Date    Abnormal findings on diagnostic imaging of other specified body structures 05/29/2018    Abnormal CT of the chest    Coronary artery disease     Hypertension      Hypothyroidism     Multiple endocrine neoplasia (CMS/HCC)     Old myocardial infarction 05/29/2018    History of myocardial infarction    Peripheral neuropathy     Personal history of other diseases of the respiratory system 05/29/2018    History of pulmonary emphysema    Personal history of other mental and behavioral disorders 05/29/2018    History of depression    Personal history of suicidal behavior 05/29/2018    History of suicide attempt    Presence of other vascular implants and grafts 05/29/2018    History of endovascular stent graft for abdominal aortic aneurysm (AAA)    Type 2 diabetes mellitus (CMS/HCC)     Vitamin D deficiency      Past Surgical History:   Procedure Laterality Date    CAROTID STENT      CT ABDOMEN PELVIS ANGIOGRAM W AND/OR WO IV CONTRAST  03/26/2018    CT ABDOMEN PELVIS ANGIOGRAM W AND/OR WO IV CONTRAST 3/26/2018 [facility]CILLARY LEGAC[facility]    CT ABDOMEN PELVIS ANGIOGRAM W AND/OR WO IV CONTRAST  05/24/2018    CT ABDOMEN PELVIS ANGIOGRAM W AND/OR WO IV CONTRAST [place] [facility] [place]    CT ABDOMEN PELVIS ANGIOGRAM W AND/OR WO IV CONTRAST  07/14/2023    CT ABDOMEN PELVIS ANGIOGRAM W AND/OR WO IV CONTRAST 7/14/2023 [facility]    IR ANGIOGRAM AORTA ABDOMEN  03/28/2018    IR ANGIOGRAM AORTA ABDOMEN [place] [facility] [place]    IR EMBOLIZATION LYMPH NODE Bilateral 03/28/2018    IR EMBOLIZATION LYMPH NODE [place] [facility] [place]    IR EMBOLIZATION LYMPH NODE Bilateral 03/28/2018    IR EMBOLIZATION LYMPH NODE 3/28/2018 [facility] [place]    KNEE SURGERY  05/29/2018    Knee Surgery    OTHER SURGICAL HISTORY  04/13/2021    Back surgery    OTHER SURGICAL HISTORY  06/19/2018    Surgery For Abdominal Aortic Aneurysm    TONSILLECTOMY  05/29/2018    Tonsillectomy     Social History     Social History Narrative    Not on file         ALLERGIES  Other      MEDICATIONS  Current Outpatient Medications on File Prior to Visit   Medication Sig Dispense Refill    aspirin-calcium carbonate 81 mg-300  mg calcium(777 mg) tablet Take 1 tablet by mouth once daily.      atorvastatin (Lipitor) 40 mg tablet Take 1 tablet (40 mg) by mouth once daily. 90 tablet 3    blood sugar diagnostic (Accu-Chek Guide test strips) strip 1 strip by subdermal route once daily.      blood sugar diagnostic (Accu-Chek Guide test strips) strip once daily.      buPROPion XL (Wellbutrin XL) 150 mg 24 hr tablet Take 1 tablet (150 mg) by mouth once daily. 90 tablet 3    cholecalciferol (Vitamin D-3) 50 mcg (2,000 unit) capsule Take 1 capsule (50 mcg) by mouth once daily. 90 capsule 3    clonazePAM (KlonoPIN) 1 mg tablet once every 24 hours.      gabapentin (Neurontin) 100 mg capsule Take 1 capsule (100 mg) by mouth once daily at bedtime. 90 capsule 3    gabapentin (Neurontin) 300 mg capsule Take 1 capsule (300 mg) by mouth once daily at bedtime. 90 capsule 3    levothyroxine (Synthroid, Levoxyl) 25 mcg tablet Take 1 tablet (25 mcg) by mouth once daily. 90 tablet 3    metFORMIN (Glucophage) 1,000 mg tablet TAKE 1 TABLET BY MOUTH TWICE A DAY 60 tablet 3    metoprolol tartrate (Lopressor) 25 mg tablet Take 0.5 tablets (12.5 mg) by mouth 2 times a day. 90 tablet 3    omega 3-dha-epa-fish oil 1,200 (144-216) mg capsule Take 2 capsules (2,400 mg) by mouth 2 times a day.      sertraline (Zoloft) 100 mg tablet Take 2 tablets (200 mg) by mouth once daily. 90 tablet 3    traZODone (Desyrel) 100 mg tablet Take 1 tablet (100 mg) by mouth once daily at bedtime. 90 tablet 3    [DISCONTINUED] nitroglycerin (Nitrostat) 0.4 mg SL tablet Place 1 tablet (0.4 mg) under the tongue every 5 minutes if needed for chest pain. 90 tablet 0    ARIPiprazole (Abilify) 2 mg tablet once every 24 hours.      hydrOXYzine pamoate (VistariL) 25 mg capsule 1 capsule (25 mg) every 8 hours.      [DISCONTINUED] ticagrelor (Brilinta) 90 mg tablet every 12 hours.       No current facility-administered medications on file prior to visit.         PHYSICAL EXAM  /80 (BP Location:  "Right arm, Patient Position: Sitting, BP Cuff Size: Adult)   Pulse 58   Temp 36.4 °C (97.6 °F)   Ht 1.816 m (5' 11.5\")   Wt 89.8 kg (198 lb)   SpO2 96%   BMI 27.23 kg/m²   Body mass index is 27.23 kg/m².  Constitutional   General appearance: Alert and in no acute distress. well developed, well nourished, within normal limits of ideal weight . Pleasant male, appears to be in no acute distress, he is sitting in chair   Eyes   Inspection of eyes: Sclera and conjunctiva were normal.   Ears, Nose, Mouth, and Throat   Ears: Auricles: Normal. No thyromegaly or neck masses noted  Pulmonary   Respiratory assessment: No respiratory distress, normal respiratory rhythm and effort.    Auscultation of lungs:  Auscultation of the lungs revealed decreased breath sounds diffusely, a few inspiratory wheezes and rhonchi noted diffusely, this is not new, no rales or crackles were heard bilaterally,  diminished breath sounds bilaterally. no bronchial breath sounds.   Cardiovascular   Auscultation of heart: Apical pulse normal, heart rate and rhythm normal, normal S1 and S2, no murmurs and no pericardial rub. The heart rate was normal. The rhythm was regular. Heart sounds: the heart sounds were distant, but normal S1 and normal S2. no murmurs were heard.  No carotid bruits are noted  Exam for edema: No peripheral edema, right ankle is larger than the left ankle.   Lymphatic   Palpation of lymph nodes in neck: No cervical lymphadenopathy.   Neurologic   Cranial nerves: Nerves 2-12 were intact, no focal neuro defects. wearing a mask, significant resting tremor of both hands is noted. This has not changed.  Psychiatric   Orientation: Oriented to person, place, and time.    Mood and affect: Normal. he is pleasant.        ASSESSMENT/PLAN  Problem List Items Addressed This Visit       AAA (abdominal aortic aneurysm) (CMS/[facility])    Current Assessment & Plan     Sees vascular surgery, recent evaluation was completed, stent is in place       "    Relevant Medications    nitroglycerin (Nitrostat) 0.4 mg SL tablet    Other Relevant Orders    Comprehensive Metabolic Panel    CBC and Auto Differential    Benign essential hypertension - Primary    Current Assessment & Plan     BP is controlled, no med changes         Relevant Orders    Albumin , Urine Random    Comprehensive Metabolic Panel    CBC and Auto Differential    Emphysema lung (CMS/HCC)    Current Assessment & Plan     Follow up with pulmonary med, he continues to smoke, again encourage smoking cessation, he had difficulty tolerating Chantix in the past         Relevant Orders    Comprehensive Metabolic Panel    CBC and Auto Differential    Hypercalcemia    Relevant Orders    Comprehensive Metabolic Panel    CBC and Auto Differential    PTH, intact    Calcium, ionized    Hyperlipidemia    Current Assessment & Plan     Stable, continue to monitor labs         Relevant Orders    Comprehensive Metabolic Panel    CBC and Auto Differential    Lipid Panel    Hypothyroidism    Current Assessment & Plan     Stable, no med changes, recheck labs in 6 months         Relevant Orders    TSH with reflex to Free T4 if abnormal    Comprehensive Metabolic Panel    CBC and Auto Differential    Major depressive disorder, single episode    Current Assessment & Plan     No change in meds, continue to follow up a [facility]         Relevant Orders    Comprehensive Metabolic Panel    CBC and Auto Differential    Non-insulin dependent type 2 diabetes mellitus (CMS/HCC)    Current Assessment & Plan     Glucoses are stable, continue to monitor         Relevant Orders    Hemoglobin A1C    Comprehensive Metabolic Panel    CBC and Auto Differential    Vitamin D deficiency    Relevant Orders    Comprehensive Metabolic Panel    CBC and Auto Differential    Vitamin D 25-Hydroxy,Total (for eval of Vitamin D levels)    Cigarette smoker    Relevant Orders    Comprehensive Metabolic Panel    CBC and Auto Differential    Stage 3a  chronic kidney disease (CMS/HCC)    Relevant Orders    Comprehensive Metabolic Panel    CBC and Auto Differential    Acquired splenic cyst    Current Assessment & Plan     Hypodense splenic lesion noted, check a splenic US         Relevant Orders    US abdomen limited spleen    Comprehensive Metabolic Panel    CBC and Auto Differential    Prostate enlargement    Current Assessment & Plan     Noted on recent abdominal CT, check PSA         Relevant Orders    PSA, total and free    Comprehensive Metabolic Panel    CBC and Auto Differential     Advise follow up in 6 months, encourage smoking cessation, check PSA and splenic US now if possible, spent about 50 minutes with the patient today  Jarett Nicole MD

## 2023-11-09 NOTE — ASSESSMENT & PLAN NOTE
Follow up with pulmonary med, he continues to smoke, again encourage smoking cessation, he had difficulty tolerating Chantix in the past

## 2023-11-13 ENCOUNTER — LAB (OUTPATIENT)
Dept: LAB | Facility: LAB | Age: 68
End: 2023-11-13
Payer: MEDICARE

## 2023-11-13 DIAGNOSIS — N40.0 PROSTATE ENLARGEMENT: ICD-10-CM

## 2023-11-13 DIAGNOSIS — E55.9 VITAMIN D DEFICIENCY: ICD-10-CM

## 2023-11-13 LAB — 25(OH)D3 SERPL-MCNC: 33 NG/ML (ref 30–100)

## 2023-11-13 PROCEDURE — 82306 VITAMIN D 25 HYDROXY: CPT

## 2023-11-13 PROCEDURE — 36415 COLL VENOUS BLD VENIPUNCTURE: CPT

## 2023-11-13 PROCEDURE — 84153 ASSAY OF PSA TOTAL: CPT

## 2023-11-13 PROCEDURE — 84154 ASSAY OF PSA FREE: CPT

## 2023-11-13 NOTE — PROGRESS NOTES
"Patient is sent at the request of No ref. provider found for my opinion regarding Type 2 diabetes.  My final recommendations will be communicated back to the requesting provider by way of shared medical record.    Subjective   Bola Marrufo is a 68 y.o. male who presents for initial visit for evaluation of Type 2 diabetes mellitus. The initial diagnosis of diabetes was made 15 years ago.     The patient states that there was a period of time where his numbers increased 2 years ago and he was on insulin therapy.        Known complications due to diabetes included CAD s/p 1 stent following MI from stress as his son committed suicide     Cardiovascular risk factors include advanced age (older than 55 for men, 65 for women), diabetes mellitus, and male gender. The patient is not on an ACE inhibitor or angiotensin II receptor blocker.  The patient has not been previously hospitalized due to diabetic ketoacidosis.     Current symptoms/problems include none. His clinical course has been stable.     Current diabetes regimen is as follows:   Metformin 1000 mg twice daily    The patient is currently checking the blood glucose one time per day.    Patient is using: glucometer  Reported values: 106-112mg/dL  Highest 116mg/dL after candy bars     Hypoglycemia frequency: Denies   Hypoglycemia awareness: Yes     Exercise:  walks dog daily x 10-15 minutes     MEALS:  Breakfast - cereal, banana, toast with butter, can omit   Lunch - omits   Dinner - sandwiches, soup, fish, brown rice, vegetables   Snacks - denies   Beverages - water, diet coke, diet sprite, diet power aid      Review of Systems   Genitourinary:         Nocturia x 2  Erectile dysfunction        Objective   /64 (BP Location: Right arm, Patient Position: Sitting, BP Cuff Size: Adult)   Pulse 59   Ht 1.816 m (5' 11.5\")   Wt 90.9 kg (200 lb 6.4 oz)   BMI 27.56 kg/m²   Physical Exam  Vitals and nursing note reviewed.   Constitutional:       General: He is not " in acute distress.     Appearance: Normal appearance. He is normal weight.   HENT:      Head: Normocephalic and atraumatic.      Nose: Nose normal.      Mouth/Throat:      Mouth: Mucous membranes are moist.   Eyes:      Extraocular Movements: Extraocular movements intact.   Cardiovascular:      Rate and Rhythm: Normal rate and regular rhythm.   Pulmonary:      Effort: Pulmonary effort is normal.      Breath sounds: Normal breath sounds.   Musculoskeletal:         General: Normal range of motion.   Skin:     General: Skin is warm.   Neurological:      Mental Status: He is alert and oriented to person, place, and time.   Psychiatric:         Mood and Affect: Mood normal.         Lab Review  Glucose (mg/dL)   Date Value   11/02/2023 107 (H)   06/19/2023 104 (H)   04/27/2023 113 (H)   10/26/2022 114 (H)     Hemoglobin A1C (%)   Date Value   11/02/2023 5.8 (H)   04/27/2023 5.9 (A)   10/26/2022 5.8 (A)   03/15/2022 5.9 (A)     Bicarbonate (mmol/L)   Date Value   11/02/2023 27   06/19/2023 25   04/27/2023 27   10/26/2022 29     Urea Nitrogen (mg/dL)   Date Value   11/02/2023 19   06/19/2023 20   04/27/2023 20   10/26/2022 23     Creatinine (mg/dL)   Date Value   11/02/2023 1.31 (H)   06/19/2023 1.26   04/27/2023 1.16   10/26/2022 1.17       Health Maintenance:   Foot Exam: November 14, 2023  Eye Exam:  January 2023  Lipid Panel: April 27, 2023  Urine Albumin: November 2, 2023    Assessment/Plan   68-year-old male presents for the evaluation of further management of type 2 diabetes mellitus.  His last hemoglobin A1c was found to be 5.8% as of November 2023.  His blood pressure is at goal.  He is noted to be on a statin.    Non-insulin dependent type 2 diabetes mellitus (CMS/HCC)  To continue Metformin 1000mg twice daily   The A1C is at goal, which is less than 7%  Please keep feet moisturized and inspect daily  To check a blood sugar every other day   Counseled to adopt an exercise regimen to include 150 minutes of moderate  intensity exercise per week of moderate intensity    Please ensure to stick to a low-carb diet as dietary indiscretions can easily lead to progressive hyperglycemia  For follow up in 6 months

## 2023-11-13 NOTE — PATIENT INSTRUCTIONS
Thank you for choosing [facility]  for your health care needs.  If you have any questions, concerns or medical needs, please feel free to contact our office at [phone].    Please ensure you complete your blood work one week before the next scheduled appointment.    To continue Metformin 1000mg twice daily   The A1C is at goal   Please keep feet moisturized and inspect daily  To check a blood sugar every other day   Counseled to adopt an exercise regimen to include 150 minutes of moderate intensity exercise per week of moderate intensity    For follow up in 6 months

## 2023-11-14 ENCOUNTER — OFFICE VISIT (OUTPATIENT)
Dept: ENDOCRINOLOGY | Facility: CLINIC | Age: 68
End: 2023-11-14
Payer: MEDICARE

## 2023-11-14 VITALS
HEIGHT: 72 IN | HEART RATE: 59 BPM | BODY MASS INDEX: 27.14 KG/M2 | DIASTOLIC BLOOD PRESSURE: 64 MMHG | WEIGHT: 200.4 LBS | SYSTOLIC BLOOD PRESSURE: 112 MMHG

## 2023-11-14 DIAGNOSIS — E11.9 TYPE 2 DIABETES MELLITUS WITHOUT COMPLICATION, WITHOUT LONG-TERM CURRENT USE OF INSULIN (MULTI): ICD-10-CM

## 2023-11-14 LAB — POC FINGERSTICK BLOOD GLUCOSE: 107 MG/DL (ref 70–100)

## 2023-11-14 PROCEDURE — 1126F AMNT PAIN NOTED NONE PRSNT: CPT | Performed by: INTERNAL MEDICINE

## 2023-11-14 PROCEDURE — 3078F DIAST BP <80 MM HG: CPT | Performed by: INTERNAL MEDICINE

## 2023-11-14 PROCEDURE — 3074F SYST BP LT 130 MM HG: CPT | Performed by: INTERNAL MEDICINE

## 2023-11-14 PROCEDURE — 99203 OFFICE O/P NEW LOW 30 MIN: CPT | Performed by: INTERNAL MEDICINE

## 2023-11-14 PROCEDURE — 82962 GLUCOSE BLOOD TEST: CPT | Performed by: INTERNAL MEDICINE

## 2023-11-14 PROCEDURE — 1159F MED LIST DOCD IN RCRD: CPT | Performed by: INTERNAL MEDICINE

## 2023-11-14 PROCEDURE — 1160F RVW MEDS BY RX/DR IN RCRD: CPT | Performed by: INTERNAL MEDICINE

## 2023-11-14 PROCEDURE — 3044F HG A1C LEVEL LT 7.0%: CPT | Performed by: INTERNAL MEDICINE

## 2023-11-14 PROCEDURE — 3061F NEG MICROALBUMINURIA REV: CPT | Performed by: INTERNAL MEDICINE

## 2023-11-14 PROCEDURE — 4004F PT TOBACCO SCREEN RCVD TLK: CPT | Performed by: INTERNAL MEDICINE

## 2023-11-14 ASSESSMENT — ENCOUNTER SYMPTOMS: COUGH: 1

## 2023-11-15 ENCOUNTER — OFFICE VISIT (OUTPATIENT)
Dept: PULMONOLOGY | Facility: HOSPITAL | Age: 68
End: 2023-11-15
Payer: MEDICARE

## 2023-11-15 VITALS
HEIGHT: 71 IN | RESPIRATION RATE: 16 BRPM | BODY MASS INDEX: 28.27 KG/M2 | OXYGEN SATURATION: 97 % | WEIGHT: 201.9 LBS | DIASTOLIC BLOOD PRESSURE: 64 MMHG | HEART RATE: 63 BPM | TEMPERATURE: 96.8 F | SYSTOLIC BLOOD PRESSURE: 128 MMHG

## 2023-11-15 DIAGNOSIS — J30.9 ALLERGIC RHINITIS, UNSPECIFIED SEASONALITY, UNSPECIFIED TRIGGER: ICD-10-CM

## 2023-11-15 DIAGNOSIS — F17.210 CIGARETTE NICOTINE DEPENDENCE WITHOUT COMPLICATION: ICD-10-CM

## 2023-11-15 DIAGNOSIS — J44.9 CHRONIC OBSTRUCTIVE PULMONARY DISEASE, UNSPECIFIED COPD TYPE (MULTI): Primary | ICD-10-CM

## 2023-11-15 PROCEDURE — 3044F HG A1C LEVEL LT 7.0%: CPT | Performed by: NURSE PRACTITIONER

## 2023-11-15 PROCEDURE — 1160F RVW MEDS BY RX/DR IN RCRD: CPT | Performed by: NURSE PRACTITIONER

## 2023-11-15 PROCEDURE — 4004F PT TOBACCO SCREEN RCVD TLK: CPT | Performed by: NURSE PRACTITIONER

## 2023-11-15 PROCEDURE — 99213 OFFICE O/P EST LOW 20 MIN: CPT | Performed by: NURSE PRACTITIONER

## 2023-11-15 PROCEDURE — 1159F MED LIST DOCD IN RCRD: CPT | Performed by: NURSE PRACTITIONER

## 2023-11-15 PROCEDURE — 1126F AMNT PAIN NOTED NONE PRSNT: CPT | Performed by: NURSE PRACTITIONER

## 2023-11-15 PROCEDURE — 3061F NEG MICROALBUMINURIA REV: CPT | Performed by: NURSE PRACTITIONER

## 2023-11-15 PROCEDURE — 3074F SYST BP LT 130 MM HG: CPT | Performed by: NURSE PRACTITIONER

## 2023-11-15 PROCEDURE — 99213 OFFICE O/P EST LOW 20 MIN: CPT | Mod: ZK | Performed by: NURSE PRACTITIONER

## 2023-11-15 PROCEDURE — 3078F DIAST BP <80 MM HG: CPT | Performed by: NURSE PRACTITIONER

## 2023-11-15 ASSESSMENT — ENCOUNTER SYMPTOMS
RHINORRHEA: 0
FATIGUE: 0
FEVER: 0
SHORTNESS OF BREATH: 1
COUGH: 0
UNEXPECTED WEIGHT CHANGE: 0
CHILLS: 0
WHEEZING: 0

## 2023-11-15 NOTE — PROGRESS NOTES
Subjective   Patient ID: Bola Marrufo is a 68 y.o. male who presents for COPD follow up.     HPI: Patient has PMH of MI in 2013, CAD, COPD, HTN, HLD, hypothyroidism, depression, and DMII.  He is a current 1/2 ppd smoker and mostly smoked at 1-2 ppd for the past 50 years. 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 He states that he only gets shortness of breath on strenuous activity. He has no other concerns.     Review of Systems   Constitutional:  Negative for chills, fatigue, fever and unexpected weight change.   HENT:  Negative for congestion, postnasal drip and rhinorrhea.    Respiratory:  Positive for shortness of breath. Negative for cough (denies hemoptysis.) and wheezing.    Cardiovascular:  Negative for chest pain and leg swelling.   All other systems reviewed and are negative.      Objective   Physical Exam  Vitals reviewed.   Constitutional:       Appearance: Normal appearance.   HENT:      Head: Normocephalic.   Cardiovascular:      Rate and Rhythm: Normal rate and regular rhythm.   Pulmonary:      Effort: Pulmonary effort is normal.      Breath sounds: Decreased breath sounds present.   Skin:     General: Skin is warm and dry.   Neurological:      Mental Status: He is alert.         Assessment/Plan   1. COPD  2. Nicotine dependence  3. History of lung nodules  4. Seasonal allergies     Plan:     -PFTs from 2018 with air trapping suggestive of COPD. Repeat PFTs with FEV1 80-95,  discussed at length that he has significant BDR. Recommend albuterol prn.   -He is a current smoker at 1/2 ppd but mostly smoked from 1-2 ppd for the past 50 years. Smoking cessation counseling provided for 5 min, he has tried Chantix in the past, he declines NRT today.   -He has had lung nodules on CT chest but stable since 2017. He had LDCT done May 2023 with no concerning nodules, order placed for repeat LDCT May 2024.   -He has sinus drainage causing him to cough currently due to the weather changes. I offered  allergy medications, he declined today. IGE, RAST ordered.     We discussed testing results and I recommend he start albuterol prn. He is agreeable to plan of care. I ordered LDCT for May and I will bring him back with me in 9 months. I instructed patient to call sooner if needed.

## 2023-11-15 NOTE — PATIENT INSTRUCTIONS
Start albuterol 2 puffs as needed for shortness of breath or before prolonged activity. You can use this every 4 hours if needed.   Please get CT scan of your chest in May 2024.   Call with any questions or concerns.   Follow up with me in 9 months.

## 2023-11-17 LAB
PSA FREE MFR SERPL: 23 %
PSA FREE SERPL-MCNC: 1 NG/ML
PSA SERPL IA-MCNC: 4.4 NG/ML (ref 0–4)

## 2023-11-17 NOTE — ASSESSMENT & PLAN NOTE
To continue Metformin 1000mg twice daily   The A1C is at goal, which is less than 7%  Please keep feet moisturized and inspect daily  To check a blood sugar every other day   Counseled to adopt an exercise regimen to include 150 minutes of moderate intensity exercise per week of moderate intensity    Please ensure to stick to a low-carb diet as dietary indiscretions can easily lead to progressive hyperglycemia  For follow up in 6 months

## 2023-11-24 ENCOUNTER — APPOINTMENT (OUTPATIENT)
Dept: RADIOLOGY | Facility: CLINIC | Age: 68
End: 2023-11-24
Payer: MEDICARE

## 2023-11-27 ENCOUNTER — ANCILLARY PROCEDURE (OUTPATIENT)
Dept: RADIOLOGY | Facility: CLINIC | Age: 68
End: 2023-11-27
Payer: MEDICARE

## 2023-11-27 DIAGNOSIS — D73.4: ICD-10-CM

## 2023-11-27 PROCEDURE — 76705 ECHO EXAM OF ABDOMEN: CPT | Performed by: RADIOLOGY

## 2023-11-27 PROCEDURE — 76705 ECHO EXAM OF ABDOMEN: CPT

## 2023-12-31 DIAGNOSIS — E11.9 NON-INSULIN DEPENDENT TYPE 2 DIABETES MELLITUS (MULTI): ICD-10-CM

## 2024-01-02 RX ORDER — METFORMIN HYDROCHLORIDE 1000 MG/1
1000 TABLET ORAL 2 TIMES DAILY
Qty: 180 TABLET | Refills: 3 | Status: SHIPPED | OUTPATIENT
Start: 2024-01-02 | End: 2024-05-09 | Stop reason: SDUPTHER

## 2024-04-10 PROBLEM — R26.89 LOSS OF BALANCE: Status: ACTIVE | Noted: 2023-09-01

## 2024-04-11 ENCOUNTER — OFFICE VISIT (OUTPATIENT)
Dept: CARDIOLOGY | Facility: CLINIC | Age: 69
End: 2024-04-11
Payer: MEDICARE

## 2024-04-11 VITALS
WEIGHT: 196 LBS | HEIGHT: 71 IN | BODY MASS INDEX: 27.44 KG/M2 | SYSTOLIC BLOOD PRESSURE: 120 MMHG | HEART RATE: 59 BPM | DIASTOLIC BLOOD PRESSURE: 76 MMHG

## 2024-04-11 DIAGNOSIS — Z95.828 HISTORY OF REPAIR OF ANEURYSM OF ABDOMINAL AORTA USING ENDOVASCULAR STENT GRAFT: ICD-10-CM

## 2024-04-11 DIAGNOSIS — F17.200 TOBACCO USE DISORDER: ICD-10-CM

## 2024-04-11 DIAGNOSIS — I10 BENIGN ESSENTIAL HYPERTENSION: ICD-10-CM

## 2024-04-11 DIAGNOSIS — Z95.5 HISTORY OF HEART ARTERY STENT: ICD-10-CM

## 2024-04-11 DIAGNOSIS — I25.10 ARTERIOSCLEROSIS OF CORONARY ARTERY: Primary | ICD-10-CM

## 2024-04-11 DIAGNOSIS — Z95.5 PRESENCE OF STENT IN CORONARY ARTERY: ICD-10-CM

## 2024-04-11 DIAGNOSIS — E78.5 HYPERLIPIDEMIA, UNSPECIFIED HYPERLIPIDEMIA TYPE: ICD-10-CM

## 2024-04-11 PROCEDURE — 3078F DIAST BP <80 MM HG: CPT | Performed by: INTERNAL MEDICINE

## 2024-04-11 PROCEDURE — 99214 OFFICE O/P EST MOD 30 MIN: CPT | Performed by: INTERNAL MEDICINE

## 2024-04-11 PROCEDURE — 93000 ELECTROCARDIOGRAM COMPLETE: CPT | Performed by: INTERNAL MEDICINE

## 2024-04-11 PROCEDURE — 3074F SYST BP LT 130 MM HG: CPT | Performed by: INTERNAL MEDICINE

## 2024-04-11 PROCEDURE — 1160F RVW MEDS BY RX/DR IN RCRD: CPT | Performed by: INTERNAL MEDICINE

## 2024-04-11 PROCEDURE — 1159F MED LIST DOCD IN RCRD: CPT | Performed by: INTERNAL MEDICINE

## 2024-04-11 RX ORDER — METOPROLOL TARTRATE 25 MG/1
12.5 TABLET, FILM COATED ORAL 2 TIMES DAILY
Qty: 90 TABLET | Refills: 3 | Status: SHIPPED | OUTPATIENT
Start: 2024-04-11 | End: 2024-05-03

## 2024-04-11 RX ORDER — ATORVASTATIN CALCIUM 40 MG/1
40 TABLET, FILM COATED ORAL DAILY
Qty: 90 TABLET | Refills: 3 | Status: SHIPPED | OUTPATIENT
Start: 2024-04-11 | End: 2024-05-09 | Stop reason: SDUPTHER

## 2024-04-11 ASSESSMENT — ENCOUNTER SYMPTOMS
OCCASIONAL FEELINGS OF UNSTEADINESS: 0
DEPRESSION: 0
LOSS OF SENSATION IN FEET: 0

## 2024-04-11 NOTE — PROGRESS NOTES
"Chief Complaint:   Annual Exam (yearly)     History Of Present Illness:    Bola Marrufo is a 68 y.o. male presenting for annual follow-up.    He has a past medical history of ST elevation myocardial infarction in November 2013. He underwent PCI to RCA, LV function was preserved. He has history of severe depression and suicide attempts, AAA repair. He does not have chest pain, shortness of breath, lightheadedness, palpitation, history of loss of consciousness.      EKG shows sinus rhythm.     He is still smoking. He has cut back but unable to quit yet.  He states he is taking 6 cigarettes a day now.   .     Last Recorded Vitals:  Vitals:    04/11/24 1234   BP: 120/76   BP Location: Left arm   Pulse: 59   Weight: 88.9 kg (196 lb)   Height: 1.803 m (5' 11\")       Past Medical History:  He has a past medical history of Abnormal findings on diagnostic imaging of other specified body structures (05/29/2018), Coronary artery disease, Hypertension, Hypothyroidism, Multiple endocrine neoplasia (CMS/HCC), Old myocardial infarction (05/29/2018), Peripheral neuropathy, Personal history of other diseases of the respiratory system (05/29/2018), Personal history of other mental and behavioral disorders (05/29/2018), Personal history of suicidal behavior (05/29/2018), Presence of other vascular implants and grafts (05/29/2018), Type 2 diabetes mellitus (CMS/HCC), and Vitamin D deficiency.    Past Surgical History:  He has a past surgical history that includes Knee surgery (05/29/2018); Tonsillectomy (05/29/2018); Other surgical history (04/13/2021); Other surgical history (06/19/2018); CT angio abdomen pelvis w and or wo IV IV contrast (03/26/2018); CT angio abdomen pelvis w and or wo IV IV contrast (05/24/2018); IR angiogram aorta abdomen (03/28/2018); IR embolization lymph node (Bilateral, 03/28/2018); IR embolization lymph node (Bilateral, 03/28/2018); CT angio abdomen pelvis w and or wo IV IV contrast (07/14/2023); and " Carotid stent.      Social History:  He reports that he has been smoking cigarettes. He has a 25 pack-year smoking history. He has never used smokeless tobacco. He reports that he does not currently use alcohol. He reports that he does not use drugs.    Family History:  Family History   Problem Relation Name Age of Onset    Diabetes Father Bola Marrufo     Colon cancer Father Bola Marrufo     Prostate cancer Father Bola Marrufo     Breast cancer Sister Ana Luisa Castillo         Allergies:  Patient has no known allergies.    Outpatient Medications:  Current Outpatient Medications   Medication Instructions    albuterol 90 mcg/actuation aerosol [facility] breath activated inhaler 2 puffs, inhalation, Every 4 hours PRN    aspirin-calcium carbonate 81 mg-300 mg calcium(777 mg) tablet 1 tablet, oral, Daily    atorvastatin (LIPITOR) 40 mg, oral, Daily    buPROPion XL (WELLBUTRIN XL) 150 mg, oral, Daily    cholecalciferol (VITAMIN D-3) 50 mcg, oral, Daily    clonazePAM (KLONOPIN) 1 mg, oral, As needed    gabapentin (NEURONTIN) 100 mg, oral, Nightly    gabapentin (NEURONTIN) 300 mg, oral, Nightly    levothyroxine (SYNTHROID, LEVOXYL) 25 mcg, oral, Daily    metFORMIN (GLUCOPHAGE) 1,000 mg, oral, 2 times daily    metoprolol tartrate (LOPRESSOR) 12.5 mg, oral, 2 times daily    nitroglycerin (NITROSTAT) 0.4 mg, sublingual, Every 5 min PRN    omega 3-dha-epa-fish oil 1,200 (144-216) mg capsule 2 capsules, oral, 2 times daily    sertraline (ZOLOFT) 200 mg, oral, Daily    traZODone (DESYREL) 100 mg, oral, Nightly       Physical Exam:  Physical Exam  Vitals reviewed.   Constitutional:       Appearance: Normal appearance.   Neck:      Vascular: No carotid bruit or JVD.   Cardiovascular:      Rate and Rhythm: Normal rate and regular rhythm.      Heart sounds: Normal heart sounds, S1 normal and S2 normal. No murmur heard.  Pulmonary:      Effort: Pulmonary effort is normal.      Breath sounds: Examination of the  "left-lower field reveals wheezing. Wheezing present.   Abdominal:      General: Abdomen is flat. Bowel sounds are normal.      Palpations: Abdomen is soft.   Musculoskeletal:      Right lower leg: No edema.      Left lower leg: No edema.   Skin:     General: Skin is warm.   Neurological:      Mental Status: He is alert. Mental status is at baseline.      Motor: Tremor present.   Psychiatric:         Mood and Affect: Mood normal.         Behavior: Behavior normal.           Last Labs:  CBC -  Lab Results   Component Value Date    WBC 9.4 04/27/2023    HGB 13.9 04/27/2023    HCT 41.6 04/27/2023    MCV 92 04/27/2023     04/27/2023       CMP -  Lab Results   Component Value Date    CALCIUM 10.7 (H) 11/02/2023    PHOS 3.0 06/19/2023    PROT 7.0 11/02/2023    ALBUMIN 4.7 11/02/2023    AST 22 11/02/2023    ALT 21 11/02/2023    ALKPHOS 52 11/02/2023    BILITOT 0.4 11/02/2023       LIPID PANEL -   Lab Results   Component Value Date    CHOL 92 04/27/2023    TRIG 195 (H) 04/27/2023    HDL 31.4 (A) 04/27/2023    CHHDL 2.9 04/27/2023    LDLF 22 04/27/2023    VLDL 39 04/27/2023    NHDL 78 03/15/2022       RENAL FUNCTION PANEL -   Lab Results   Component Value Date    GLUCOSE 107 (H) 11/02/2023     11/02/2023    K 4.5 11/02/2023     11/02/2023    CO2 27 11/02/2023    ANIONGAP 10 11/02/2023    BUN 19 11/02/2023    CREATININE 1.31 (H) 11/02/2023    GFRMALE 62 06/19/2023    CALCIUM 10.7 (H) 11/02/2023    PHOS 3.0 06/19/2023    ALBUMIN 4.7 11/02/2023        Lab Results   Component Value Date    HGBA1C 5.8 (H) 11/02/2023       Last Cardiology Tests:  ECG:  No results found for this or any previous visit from the past 1095 days.      Echo:  No results found for this or any previous visit from the past 1095 days.      Ejection Fractions:  No results found for: \"EF\"    Cath:  No results found for this or any previous visit from the past 1095 days.      Stress Test:  No results found for this or any previous visit from " the past 1095 days.      Cardiac Imaging:  No results found for this or any previous visit from the past 1095 days.          Assessment/Plan   In summary Mr. Marrufo is a 68-year-old gentleman with history of dyslipidemia, diabetes, coronary artery disease.     1-coronary artery disease-past history of myocardial infarction. He is on appropriate medical therapy. Asymptomatic. LV function is preserved.     2-hypertension-blood pressure is well controlled.     3-dyslipidemia-diabetes control has improved his lipid profile. Continue present therapy. Triglycerides seem improved.     4-tobacco abuse-he was advised to quit smoking today. He states he will try to cut back.  Have tried Chantix a few times did not work    5-history of abdominal aortic aneurysm repair-encouraged to follow-up with vascular surgeon.     Continue current medical therapy.      Gerson Acosta MD

## 2024-04-29 ENCOUNTER — LAB (OUTPATIENT)
Dept: LAB | Facility: LAB | Age: 69
End: 2024-04-29
Payer: MEDICARE

## 2024-04-29 DIAGNOSIS — N18.31 STAGE 3A CHRONIC KIDNEY DISEASE (MULTI): ICD-10-CM

## 2024-04-29 DIAGNOSIS — J30.9 ALLERGIC RHINITIS, UNSPECIFIED SEASONALITY, UNSPECIFIED TRIGGER: ICD-10-CM

## 2024-04-29 DIAGNOSIS — N40.0 PROSTATE ENLARGEMENT: ICD-10-CM

## 2024-04-29 DIAGNOSIS — F17.210 CIGARETTE SMOKER: ICD-10-CM

## 2024-04-29 DIAGNOSIS — J43.9 PULMONARY EMPHYSEMA, UNSPECIFIED EMPHYSEMA TYPE (MULTI): ICD-10-CM

## 2024-04-29 DIAGNOSIS — E03.9 ACQUIRED HYPOTHYROIDISM: ICD-10-CM

## 2024-04-29 DIAGNOSIS — I25.10 ARTERIOSCLEROSIS OF CORONARY ARTERY: ICD-10-CM

## 2024-04-29 DIAGNOSIS — Z95.828 HISTORY OF REPAIR OF ANEURYSM OF ABDOMINAL AORTA USING ENDOVASCULAR STENT GRAFT: ICD-10-CM

## 2024-04-29 DIAGNOSIS — E78.2 MIXED HYPERLIPIDEMIA: ICD-10-CM

## 2024-04-29 DIAGNOSIS — F17.200 TOBACCO USE DISORDER: ICD-10-CM

## 2024-04-29 DIAGNOSIS — F32.5 MAJOR DEPRESSIVE DISORDER WITH SINGLE EPISODE, IN REMISSION (CMS-HCC): ICD-10-CM

## 2024-04-29 DIAGNOSIS — Z95.5 PRESENCE OF STENT IN CORONARY ARTERY: ICD-10-CM

## 2024-04-29 DIAGNOSIS — Z95.5 HISTORY OF HEART ARTERY STENT: ICD-10-CM

## 2024-04-29 DIAGNOSIS — E83.52 HYPERCALCEMIA: ICD-10-CM

## 2024-04-29 DIAGNOSIS — D73.4: ICD-10-CM

## 2024-04-29 DIAGNOSIS — E55.9 VITAMIN D DEFICIENCY: ICD-10-CM

## 2024-04-29 DIAGNOSIS — I71.40 ABDOMINAL AORTIC ANEURYSM (AAA), UNSPECIFIED PART, UNSPECIFIED WHETHER RUPTURED (CMS-HCC): ICD-10-CM

## 2024-04-29 DIAGNOSIS — I10 BENIGN ESSENTIAL HYPERTENSION: ICD-10-CM

## 2024-04-29 DIAGNOSIS — E11.9 NON-INSULIN DEPENDENT TYPE 2 DIABETES MELLITUS (MULTI): ICD-10-CM

## 2024-04-29 LAB
ALBUMIN SERPL BCP-MCNC: 4.5 G/DL (ref 3.4–5)
ALP SERPL-CCNC: 44 U/L (ref 33–136)
ALT SERPL W P-5'-P-CCNC: 20 U/L (ref 10–52)
ANION GAP SERPL CALC-SCNC: 12 MMOL/L (ref 10–20)
AST SERPL W P-5'-P-CCNC: 19 U/L (ref 9–39)
BASOPHILS # BLD AUTO: 0.03 X10*3/UL (ref 0–0.1)
BASOPHILS NFR BLD AUTO: 0.5 %
BILIRUB SERPL-MCNC: 0.5 MG/DL (ref 0–1.2)
BUN SERPL-MCNC: 17 MG/DL (ref 6–23)
CA-I BLD-SCNC: 1.33 MMOL/L (ref 1.1–1.33)
CALCIUM SERPL-MCNC: 10.1 MG/DL (ref 8.6–10.3)
CHLORIDE SERPL-SCNC: 103 MMOL/L (ref 98–107)
CHOLEST SERPL-MCNC: 88 MG/DL (ref 0–199)
CHOLESTEROL/HDL RATIO: 3
CO2 SERPL-SCNC: 27 MMOL/L (ref 21–32)
CREAT SERPL-MCNC: 1.2 MG/DL (ref 0.5–1.3)
CREAT UR-MCNC: 60.1 MG/DL (ref 20–370)
EGFRCR SERPLBLD CKD-EPI 2021: 65 ML/MIN/1.73M*2
EOSINOPHIL # BLD AUTO: 0.13 X10*3/UL (ref 0–0.7)
EOSINOPHIL NFR BLD AUTO: 2.1 %
ERYTHROCYTE [DISTWIDTH] IN BLOOD BY AUTOMATED COUNT: 13.3 % (ref 11.5–14.5)
EST. AVERAGE GLUCOSE BLD GHB EST-MCNC: 120 MG/DL
GLUCOSE SERPL-MCNC: 225 MG/DL (ref 74–99)
HBA1C MFR BLD: 5.8 %
HCT VFR BLD AUTO: 42.3 % (ref 41–52)
HDLC SERPL-MCNC: 29.8 MG/DL
HGB BLD-MCNC: 14.1 G/DL (ref 13.5–17.5)
IMM GRANULOCYTES # BLD AUTO: 0.02 X10*3/UL (ref 0–0.7)
IMM GRANULOCYTES NFR BLD AUTO: 0.3 % (ref 0–0.9)
LDLC SERPL CALC-MCNC: 6 MG/DL
LYMPHOCYTES # BLD AUTO: 1.1 X10*3/UL (ref 1.2–4.8)
LYMPHOCYTES NFR BLD AUTO: 17.9 %
MCH RBC QN AUTO: 30.7 PG (ref 26–34)
MCHC RBC AUTO-ENTMCNC: 33.3 G/DL (ref 32–36)
MCV RBC AUTO: 92 FL (ref 80–100)
MICROALBUMIN UR-MCNC: 10 MG/L
MICROALBUMIN/CREAT UR: 16.6 UG/MG CREAT
MONOCYTES # BLD AUTO: 0.29 X10*3/UL (ref 0.1–1)
MONOCYTES NFR BLD AUTO: 4.7 %
NEUTROPHILS # BLD AUTO: 4.58 X10*3/UL (ref 1.2–7.7)
NEUTROPHILS NFR BLD AUTO: 74.5 %
NON HDL CHOLESTEROL: 58 MG/DL (ref 0–149)
NRBC BLD-RTO: 0 /100 WBCS (ref 0–0)
PLATELET # BLD AUTO: 140 X10*3/UL (ref 150–450)
POTASSIUM SERPL-SCNC: 4.5 MMOL/L (ref 3.5–5.3)
PROT SERPL-MCNC: 6.4 G/DL (ref 6.4–8.2)
PTH-INTACT SERPL-MCNC: 46.2 PG/ML (ref 18.5–88)
RBC # BLD AUTO: 4.59 X10*6/UL (ref 4.5–5.9)
SODIUM SERPL-SCNC: 137 MMOL/L (ref 136–145)
TRIGL SERPL-MCNC: 261 MG/DL (ref 0–149)
TSH SERPL-ACNC: 3.22 MIU/L (ref 0.44–3.98)
VLDL: 52 MG/DL (ref 0–40)
WBC # BLD AUTO: 6.2 X10*3/UL (ref 4.4–11.3)

## 2024-04-29 PROCEDURE — 86003 ALLG SPEC IGE CRUDE XTRC EA: CPT

## 2024-04-29 PROCEDURE — 36415 COLL VENOUS BLD VENIPUNCTURE: CPT

## 2024-04-29 PROCEDURE — 83970 ASSAY OF PARATHORMONE: CPT

## 2024-04-29 PROCEDURE — 80053 COMPREHEN METABOLIC PANEL: CPT

## 2024-04-29 PROCEDURE — 85025 COMPLETE CBC W/AUTO DIFF WBC: CPT

## 2024-04-29 PROCEDURE — 83036 HEMOGLOBIN GLYCOSYLATED A1C: CPT

## 2024-04-29 PROCEDURE — 84443 ASSAY THYROID STIM HORMONE: CPT

## 2024-04-29 PROCEDURE — 82570 ASSAY OF URINE CREATININE: CPT

## 2024-04-29 PROCEDURE — 80061 LIPID PANEL: CPT

## 2024-04-29 PROCEDURE — 82785 ASSAY OF IGE: CPT

## 2024-04-29 PROCEDURE — 82330 ASSAY OF CALCIUM: CPT

## 2024-04-29 PROCEDURE — 82043 UR ALBUMIN QUANTITATIVE: CPT

## 2024-04-30 LAB
A ALTERNATA IGE QN: 0.18 KU/L
A FUMIGATUS IGE QN: <0.1 KU/L
BERMUDA GRASS IGE QN: <0.1 KU/L
BOXELDER IGE QN: <0.1 KU/L
C HERBARUM IGE QN: <0.1 KU/L
CALIF WALNUT POLN IGE QN: <0.1 KU/L
CAT DANDER IGE QN: <0.1 KU/L
CMN PIGWEED IGE QN: <0.1 KU/L
COMMON RAGWEED IGE QN: <0.1 KU/L
COTTONWOOD IGE QN: <0.1 KU/L
D FARINAE IGE QN: <0.1 KU/L
D PTERONYSS IGE QN: <0.1 KU/L
DOG DANDER IGE QN: <0.1 KU/L
ENGL PLANTAIN IGE QN: <0.1 KU/L
GOOSEFOOT IGE QN: <0.1 KU/L
JOHNSON GRASS IGE QN: <0.1 KU/L
KENT BLUE GRASS IGE QN: <0.1 KU/L
LONDON PLANE IGE QN: <0.1 KU/L
MT JUNIPER IGE QN: <0.1 KU/L
P NOTATUM IGE QN: <0.1 KU/L
PECAN/HICK TREE IGE QN: <0.1 KU/L
ROACH IGE QN: <0.1 KU/L
SALTWORT IGE QN: <0.1 KU/L
SHEEP SORREL IGE QN: <0.1 KU/L
SILVER BIRCH IGE QN: <0.1 KU/L
TIMOTHY IGE QN: <0.1 KU/L
TOTAL IGE SMQN RAST: 5.87 KU/L
WHITE ASH IGE QN: <0.1 KU/L
WHITE ELM IGE QN: <0.1 KU/L
WHITE MULBERRY IGE QN: <0.1 KU/L
WHITE OAK IGE QN: <0.1 KU/L

## 2024-05-03 DIAGNOSIS — I10 BENIGN ESSENTIAL HYPERTENSION: ICD-10-CM

## 2024-05-03 RX ORDER — METOPROLOL TARTRATE 25 MG/1
12.5 TABLET, FILM COATED ORAL 2 TIMES DAILY
Qty: 90 TABLET | Refills: 3 | Status: SHIPPED | OUTPATIENT
Start: 2024-05-03 | End: 2024-05-09 | Stop reason: SDUPTHER

## 2024-05-09 ENCOUNTER — OFFICE VISIT (OUTPATIENT)
Dept: PRIMARY CARE | Facility: CLINIC | Age: 69
End: 2024-05-09
Payer: MEDICARE

## 2024-05-09 VITALS
TEMPERATURE: 97.5 F | OXYGEN SATURATION: 98 % | BODY MASS INDEX: 26.01 KG/M2 | HEART RATE: 68 BPM | RESPIRATION RATE: 18 BRPM | DIASTOLIC BLOOD PRESSURE: 76 MMHG | SYSTOLIC BLOOD PRESSURE: 118 MMHG | HEIGHT: 72 IN | WEIGHT: 192 LBS

## 2024-05-09 DIAGNOSIS — E78.5 HYPERLIPIDEMIA, UNSPECIFIED HYPERLIPIDEMIA TYPE: ICD-10-CM

## 2024-05-09 DIAGNOSIS — G25.0 BENIGN ESSENTIAL TREMOR: ICD-10-CM

## 2024-05-09 DIAGNOSIS — J44.9 CHRONIC OBSTRUCTIVE PULMONARY DISEASE, UNSPECIFIED COPD TYPE (MULTI): ICD-10-CM

## 2024-05-09 DIAGNOSIS — Z00.00 ROUTINE GENERAL MEDICAL EXAMINATION AT HEALTH CARE FACILITY: Primary | ICD-10-CM

## 2024-05-09 DIAGNOSIS — S12.390D: ICD-10-CM

## 2024-05-09 DIAGNOSIS — R29.6 RECURRENT FALLS: ICD-10-CM

## 2024-05-09 DIAGNOSIS — E03.9 ACQUIRED HYPOTHYROIDISM: ICD-10-CM

## 2024-05-09 DIAGNOSIS — I71.40 ABDOMINAL AORTIC ANEURYSM (AAA), UNSPECIFIED PART, UNSPECIFIED WHETHER RUPTURED (CMS-HCC): ICD-10-CM

## 2024-05-09 DIAGNOSIS — M80.08XA AGE-REL OSTEOPOR W CURRENT PATH FRACTURE, VERTEBRA(E), INIT (MULTI): ICD-10-CM

## 2024-05-09 DIAGNOSIS — E55.9 VITAMIN D INSUFFICIENCY: ICD-10-CM

## 2024-05-09 DIAGNOSIS — M54.50 LOW BACK PAIN, UNSPECIFIED BACK PAIN LATERALITY, UNSPECIFIED CHRONICITY, UNSPECIFIED WHETHER SCIATICA PRESENT: ICD-10-CM

## 2024-05-09 DIAGNOSIS — I10 BENIGN ESSENTIAL HYPERTENSION: ICD-10-CM

## 2024-05-09 DIAGNOSIS — N40.0 PROSTATE ENLARGEMENT: ICD-10-CM

## 2024-05-09 DIAGNOSIS — F32.5 MAJOR DEPRESSIVE DISORDER WITH SINGLE EPISODE, IN REMISSION (CMS-HCC): ICD-10-CM

## 2024-05-09 DIAGNOSIS — E11.9 NON-INSULIN DEPENDENT TYPE 2 DIABETES MELLITUS (MULTI): ICD-10-CM

## 2024-05-09 DIAGNOSIS — Z72.0 TOBACCO USE: ICD-10-CM

## 2024-05-09 DIAGNOSIS — M17.11 PRIMARY OSTEOARTHRITIS OF RIGHT KNEE: ICD-10-CM

## 2024-05-09 PROBLEM — Z91.51 HISTORY OF SUICIDE ATTEMPT: Status: RESOLVED | Noted: 2024-05-09 | Resolved: 2024-05-09

## 2024-05-09 PROBLEM — J30.9 ALLERGIC RHINITIS: Status: RESOLVED | Noted: 2024-05-09 | Resolved: 2024-05-09

## 2024-05-09 PROBLEM — Z86.59 HISTORY OF DEPRESSION: Status: ACTIVE | Noted: 2023-05-19

## 2024-05-09 PROBLEM — D73.4: Status: RESOLVED | Noted: 2023-11-09 | Resolved: 2024-05-09

## 2024-05-09 PROCEDURE — 1158F ADVNC CARE PLAN TLK DOCD: CPT | Performed by: INTERNAL MEDICINE

## 2024-05-09 PROCEDURE — 1125F AMNT PAIN NOTED PAIN PRSNT: CPT | Performed by: INTERNAL MEDICINE

## 2024-05-09 PROCEDURE — 90715 TDAP VACCINE 7 YRS/> IM: CPT | Performed by: INTERNAL MEDICINE

## 2024-05-09 PROCEDURE — 3078F DIAST BP <80 MM HG: CPT | Performed by: INTERNAL MEDICINE

## 2024-05-09 PROCEDURE — 1159F MED LIST DOCD IN RCRD: CPT | Performed by: INTERNAL MEDICINE

## 2024-05-09 PROCEDURE — 1160F RVW MEDS BY RX/DR IN RCRD: CPT | Performed by: INTERNAL MEDICINE

## 2024-05-09 PROCEDURE — 3061F NEG MICROALBUMINURIA REV: CPT | Performed by: INTERNAL MEDICINE

## 2024-05-09 PROCEDURE — 99215 OFFICE O/P EST HI 40 MIN: CPT | Performed by: INTERNAL MEDICINE

## 2024-05-09 PROCEDURE — 1170F FXNL STATUS ASSESSED: CPT | Performed by: INTERNAL MEDICINE

## 2024-05-09 PROCEDURE — 3044F HG A1C LEVEL LT 7.0%: CPT | Performed by: INTERNAL MEDICINE

## 2024-05-09 PROCEDURE — 3074F SYST BP LT 130 MM HG: CPT | Performed by: INTERNAL MEDICINE

## 2024-05-09 PROCEDURE — 90471 IMMUNIZATION ADMIN: CPT | Performed by: INTERNAL MEDICINE

## 2024-05-09 PROCEDURE — G0439 PPPS, SUBSEQ VISIT: HCPCS | Performed by: INTERNAL MEDICINE

## 2024-05-09 PROCEDURE — 1123F ACP DISCUSS/DSCN MKR DOCD: CPT | Performed by: INTERNAL MEDICINE

## 2024-05-09 PROCEDURE — 3048F LDL-C <100 MG/DL: CPT | Performed by: INTERNAL MEDICINE

## 2024-05-09 RX ORDER — METOPROLOL TARTRATE 25 MG/1
12.5 TABLET, FILM COATED ORAL 2 TIMES DAILY
Qty: 90 TABLET | Refills: 3 | Status: SHIPPED | OUTPATIENT
Start: 2024-05-09

## 2024-05-09 RX ORDER — LEVOTHYROXINE SODIUM 25 UG/1
25 TABLET ORAL DAILY
Qty: 90 TABLET | Refills: 3 | Status: SHIPPED | OUTPATIENT
Start: 2024-05-09

## 2024-05-09 RX ORDER — ATORVASTATIN CALCIUM 40 MG/1
40 TABLET, FILM COATED ORAL DAILY
Qty: 90 TABLET | Refills: 3 | Status: SHIPPED | OUTPATIENT
Start: 2024-05-09

## 2024-05-09 RX ORDER — METFORMIN HYDROCHLORIDE 1000 MG/1
1000 TABLET ORAL 2 TIMES DAILY
Qty: 180 TABLET | Refills: 3 | Status: SHIPPED | OUTPATIENT
Start: 2024-05-09 | End: 2024-05-22

## 2024-05-09 ASSESSMENT — ENCOUNTER SYMPTOMS
LOSS OF SENSATION IN FEET: 0
OCCASIONAL FEELINGS OF UNSTEADINESS: 1
DEPRESSION: 1

## 2024-05-09 ASSESSMENT — PATIENT HEALTH QUESTIONNAIRE - PHQ9
SUM OF ALL RESPONSES TO PHQ9 QUESTIONS 1 & 2: 2
1. LITTLE INTEREST OR PLEASURE IN DOING THINGS: SEVERAL DAYS
SUM OF ALL RESPONSES TO PHQ9 QUESTIONS 1 & 2: 2
2. FEELING DOWN, DEPRESSED OR HOPELESS: SEVERAL DAYS
2. FEELING DOWN, DEPRESSED OR HOPELESS: SEVERAL DAYS
1. LITTLE INTEREST OR PLEASURE IN DOING THINGS: SEVERAL DAYS
10. IF YOU CHECKED OFF ANY PROBLEMS, HOW DIFFICULT HAVE THESE PROBLEMS MADE IT FOR YOU TO DO YOUR WORK, TAKE CARE OF THINGS AT HOME, OR GET ALONG WITH OTHER PEOPLE: SOMEWHAT DIFFICULT
10. IF YOU CHECKED OFF ANY PROBLEMS, HOW DIFFICULT HAVE THESE PROBLEMS MADE IT FOR YOU TO DO YOUR WORK, TAKE CARE OF THINGS AT HOME, OR GET ALONG WITH OTHER PEOPLE: SOMEWHAT DIFFICULT

## 2024-05-09 ASSESSMENT — LIFESTYLE VARIABLES
HOW OFTEN DO YOU HAVE SIX OR MORE DRINKS ON ONE OCCASION: NEVER
SKIP TO QUESTIONS 9-10: 1
HOW OFTEN DO YOU HAVE A DRINK CONTAINING ALCOHOL: MONTHLY OR LESS
HOW MANY STANDARD DRINKS CONTAINING ALCOHOL DO YOU HAVE ON A TYPICAL DAY: 1 OR 2
AUDIT-C TOTAL SCORE: 1

## 2024-05-09 ASSESSMENT — ACTIVITIES OF DAILY LIVING (ADL)
GROCERY_SHOPPING: INDEPENDENT
DOING_HOUSEWORK: INDEPENDENT
DRESSING: INDEPENDENT
TAKING_MEDICATION: INDEPENDENT
MANAGING_FINANCES: INDEPENDENT
BATHING: INDEPENDENT

## 2024-05-09 ASSESSMENT — PAIN SCALES - GENERAL: PAINLEVEL: 9

## 2024-05-09 NOTE — ASSESSMENT & PLAN NOTE
Medicare wellness exam completed, refer to physical medicine for evaluation of back pain, knee pain and fall risk

## 2024-05-09 NOTE — PATIENT INSTRUCTIONS

## 2024-05-09 NOTE — PROGRESS NOTES
"Subjective   Reason for Visit: Bola Marrufo is an 68 y.o. male here for a Medicare Wellness visit.     Past Medical, Surgical, and Family History reviewed and updated in chart.    Reviewed all medications by prescribing practitioner or clinical pharmacist (such as prescriptions, OTCs, herbal therapies and supplements) and documented in the medical record.    HPI    Follow up and Medicare wellness exam:     lung nodule: sees pulmonary medicine, he now sees pulmonary medicine locally, he had PFTs completed, he continues to smoke about the same amount, he smokes when he drinks coffee in the morning     Aneurysm evaluation: he had aneurysm checked, study noted that prostate has enlarged. He does not see a urologist, he gets out of bed to urinate at 3 am every day     smoker: has decreased smoking to less than 1/2 ppd, he continues to smoke less, he only smokes now when he goes outside. [employer] inspected his apartment facility, no smoking is allowed inside, he tried Chantix , he had bad dreams, it was prescribed by cardiology. He did not want to have bad dreams. Patient usually smokes when he walks his dog or drinks his coffee.      depression: patient takes sertraline 200 mg daily, he also takes Trazodone and Welbutrin, patient is seen at the [facility]. Patient is able to sleep off and on now . Patient has not seen grandchildren, son now lives with patient's ex wife (son's mother), he sees little of his grandchildren now, \"I've kind of accepted it\". He takes clonazepam for anxiety issues, I have personally reviewed the OARRS report.  This report is scanned into the electronic medical record.  I have considered the risks of abuse, dependence, addiction and diversion.  I believe that it is clinically appropriate to prescribe this medication. He does have some dreaming at night, he wakes up sweating at times. Jarett Nicole MD        low back pain: patient continues to have low back pain. He also has an " "unsteady gait, it seems to occur at any time of the day.     Patient has right knee pain, the right knee \"keeps going out\", he is not currently seeing an orthopedic physician      hypercalcemia: noted on evaluation, recent calcium level was normal     Type II Diabetes: He takes metformin twice daily.      hyperlipidemia. Current treatment includes atorvastatin, and fish oil.      essential hypertension, he takes metoprolol      Vitamin d deficiency: he takes vitamin d        Patient Care Team:  Jarett Nicole MD as PCP - General (Internal Medicine)  Jarett Nicole MD as PCP - [employer] PCP     Review of Systems    otherwise negative aside from what was mentioned above in HPI.     Objective   Vitals:  /76 (BP Location: Right arm, Patient Position: Sitting, BP Cuff Size: Large adult)   Pulse 68   Temp 36.4 °C (97.5 °F)   Resp 18   Ht 1.829 m (6')   Wt 87.1 kg (192 lb)   SpO2 98%   BMI 26.04 kg/m²       Physical Exam    Constitutional   General appearance: Alert and in no acute distress. well developed, well nourished, within normal limits of ideal weight . Pleasant male, appears to be in no acute distress, he is sitting in chair   Eyes   Inspection of eyes: Sclera and conjunctiva were normal.   Ears, Nose, Mouth, and Throat   Ears: Auricles: Normal. No thyromegaly or neck masses noted  Pulmonary   Respiratory assessment: No respiratory distress, normal respiratory rhythm and effort.    Auscultation of lungs:  Auscultation of the lungs revealed decreased breath sounds diffusely, a few inspiratory wheezes and rhonchi noted diffusely, this is not new, no rales or crackles were heard bilaterally,  diminished breath sounds bilaterally. no bronchial breath sounds.   Cardiovascular   Auscultation of heart: Apical pulse normal, heart rate and rhythm normal, normal S1 and S2, no murmurs and no pericardial rub. The heart rate was normal. The rhythm was regular. Heart sounds: the heart " sounds were distant, but normal S1 and normal S2. no murmurs were heard.  No carotid bruits are noted  Exam for edema: No peripheral edema   Lymphatic   Palpation of lymph nodes in neck: No cervical lymphadenopathy.   Neurologic   Cranial nerves: Nerves 2-12 were intact, no focal neuro defects. significant resting tremor of both hands is noted. This has not changed.  Psychiatric   Orientation: Oriented to person, place, and time.    Mood and affect: Normal. he is pleasant.       MSK      Slight kyphosis is noted  Assessment/Plan   Problem List Items Addressed This Visit       AAA (abdominal aortic aneurysm) (CMS-HCC)    Relevant Orders    Comprehensive Metabolic Panel    CBC and Auto Differential    Back pain    Current Assessment & Plan     Refer to physical medicine/ rehab for an assessment         Relevant Orders    Referral to Physical Medicine Rehab    Comprehensive Metabolic Panel    CBC and Auto Differential    Age-rel osteopor w current path fracture, vertebra(e), init (Multi)    Relevant Orders    Referral to Physical Medicine Rehab    Comprehensive Metabolic Panel    CBC and Auto Differential    Benign essential hypertension    Current Assessment & Plan     BP is controlled, no med changes         Relevant Medications    metoprolol tartrate (Lopressor) 25 mg tablet    Other Relevant Orders    Comprehensive Metabolic Panel    CBC and Auto Differential    Albumin , Urine Random    Benign essential tremor    Relevant Orders    Referral to Physical Medicine Rehab    Comprehensive Metabolic Panel    CBC and Auto Differential    Compression fracture of fourth cervical vertebra (Multi)    Relevant Orders    Referral to Physical Medicine Rehab    Comprehensive Metabolic Panel    CBC and Auto Differential    Hyperlipidemia    Current Assessment & Plan     Continue fish oil and atorvastatin, recheck labs in 6 months         Relevant Medications    atorvastatin (Lipitor) 40 mg tablet    Other Relevant Orders    Lipid  Panel    Comprehensive Metabolic Panel    CBC and Auto Differential    Hypothyroidism    Current Assessment & Plan     Continue to monitor, no changes for now          Relevant Medications    levothyroxine (Synthroid, Levoxyl) 25 mcg tablet    Other Relevant Orders    TSH with reflex to Free T4 if abnormal    Comprehensive Metabolic Panel    CBC and Auto Differential    Major depressive disorder with single episode, in remission (CMS-[facility])    Current Assessment & Plan     No med changes, he goes to [facility]          Relevant Orders    TSH with reflex to Free T4 if abnormal    Comprehensive Metabolic Panel    CBC and Auto Differential    Non-insulin dependent type 2 diabetes mellitus (Multi)    Relevant Medications    metFORMIN (Glucophage) 1,000 mg tablet    Other Relevant Orders    Comprehensive Metabolic Panel    Hemoglobin A1C    CBC and Auto Differential    Albumin , Urine Random    Osteoarthritis of right knee    Relevant Orders    Referral to Physical Medicine Rehab    Comprehensive Metabolic Panel    CBC and Auto Differential    Recurrent falls    Relevant Orders    Referral to Physical Medicine Rehab    Comprehensive Metabolic Panel    CBC and Auto Differential    Routine general medical examination at [facility] care facility - Primary    Current Assessment & Plan     Medicare wellness exam completed, refer to physical medicine for evaluation of back pain, knee pain and fall risk         Relevant Orders    Comprehensive Metabolic Panel    CBC and Auto Differential    COPD (chronic obstructive pulmonary disease) (Multi)    Relevant Orders    Comprehensive Metabolic Panel    CBC and Auto Differential    Prostate enlargement    Current Assessment & Plan     Slightly elevated PSA, refer to urology for an assessment         Relevant Orders    Referral to Urology    Comprehensive Metabolic Panel    CBC and Auto Differential     Other Visit Diagnoses       Tobacco use        Relevant Orders    Referral to  Physical Medicine Rehab    Comprehensive Metabolic Panel    CBC and Auto Differential    Vitamin D insufficiency        Relevant Orders    Vitamin D 25-Hydroxy,Total (for eval of Vitamin D levels)             Patient was identified as a fall risk. Risk prevention instructions provided.Patient was identified as a fall risk. Risk prevention instructions provided. Son is patient's POA, but he does not talk to son at present time. Encourage smoking cessation again     TDAP ordered check labs in 6 months, follow up in 6 months

## 2024-05-16 ENCOUNTER — APPOINTMENT (OUTPATIENT)
Dept: ENDOCRINOLOGY | Facility: CLINIC | Age: 69
End: 2024-05-16
Payer: MEDICARE

## 2024-05-22 DIAGNOSIS — E11.9 NON-INSULIN DEPENDENT TYPE 2 DIABETES MELLITUS (MULTI): ICD-10-CM

## 2024-05-22 RX ORDER — METFORMIN HYDROCHLORIDE 1000 MG/1
1000 TABLET ORAL 2 TIMES DAILY
Qty: 180 TABLET | Refills: 1 | Status: SHIPPED | OUTPATIENT
Start: 2024-05-22

## 2024-06-27 ENCOUNTER — APPOINTMENT (OUTPATIENT)
Dept: PHYSICAL MEDICINE AND REHAB | Facility: CLINIC | Age: 69
End: 2024-06-27
Payer: MEDICARE

## 2024-08-15 ENCOUNTER — APPOINTMENT (OUTPATIENT)
Dept: PULMONOLOGY | Facility: HOSPITAL | Age: 69
End: 2024-08-15
Payer: MEDICARE

## 2024-09-09 ENCOUNTER — APPOINTMENT (OUTPATIENT)
Dept: UROLOGY | Facility: CLINIC | Age: 69
End: 2024-09-09
Payer: MEDICARE

## 2024-09-20 ENCOUNTER — APPOINTMENT (OUTPATIENT)
Dept: ENDOCRINOLOGY | Facility: CLINIC | Age: 69
End: 2024-09-20
Payer: MEDICARE

## 2024-09-30 ENCOUNTER — APPOINTMENT (OUTPATIENT)
Dept: UROLOGY | Facility: CLINIC | Age: 69
End: 2024-09-30
Payer: MEDICARE

## 2024-11-05 ENCOUNTER — APPOINTMENT (OUTPATIENT)
Dept: PRIMARY CARE | Facility: CLINIC | Age: 69
End: 2024-11-05
Payer: MEDICARE

## 2024-11-05 VITALS
HEART RATE: 70 BPM | WEIGHT: 196 LBS | HEIGHT: 70 IN | SYSTOLIC BLOOD PRESSURE: 130 MMHG | BODY MASS INDEX: 28.06 KG/M2 | DIASTOLIC BLOOD PRESSURE: 80 MMHG | OXYGEN SATURATION: 98 % | TEMPERATURE: 97.8 F | RESPIRATION RATE: 18 BRPM

## 2024-11-05 DIAGNOSIS — I10 BENIGN ESSENTIAL HYPERTENSION: ICD-10-CM

## 2024-11-05 DIAGNOSIS — E78.5 HYPERLIPIDEMIA, UNSPECIFIED HYPERLIPIDEMIA TYPE: ICD-10-CM

## 2024-11-05 DIAGNOSIS — E03.9 ACQUIRED HYPOTHYROIDISM: ICD-10-CM

## 2024-11-05 DIAGNOSIS — E11.9 NON-INSULIN DEPENDENT TYPE 2 DIABETES MELLITUS (MULTI): ICD-10-CM

## 2024-11-05 DIAGNOSIS — F41.9 ANXIETY: ICD-10-CM

## 2024-11-05 DIAGNOSIS — F32.9 CURRENT EPISODE OF MAJOR DEPRESSIVE DISORDER WITHOUT PRIOR EPISODE, UNSPECIFIED DEPRESSION EPISODE SEVERITY: ICD-10-CM

## 2024-11-05 PROCEDURE — 3075F SYST BP GE 130 - 139MM HG: CPT

## 2024-11-05 PROCEDURE — 99215 OFFICE O/P EST HI 40 MIN: CPT

## 2024-11-05 PROCEDURE — 3044F HG A1C LEVEL LT 7.0%: CPT

## 2024-11-05 PROCEDURE — 1159F MED LIST DOCD IN RCRD: CPT

## 2024-11-05 PROCEDURE — G2211 COMPLEX E/M VISIT ADD ON: HCPCS

## 2024-11-05 PROCEDURE — 1126F AMNT PAIN NOTED NONE PRSNT: CPT

## 2024-11-05 PROCEDURE — 3061F NEG MICROALBUMINURIA REV: CPT

## 2024-11-05 PROCEDURE — 3048F LDL-C <100 MG/DL: CPT

## 2024-11-05 PROCEDURE — 3079F DIAST BP 80-89 MM HG: CPT

## 2024-11-05 PROCEDURE — 3008F BODY MASS INDEX DOCD: CPT

## 2024-11-05 PROCEDURE — 1160F RVW MEDS BY RX/DR IN RCRD: CPT

## 2024-11-05 RX ORDER — METFORMIN HYDROCHLORIDE 1000 MG/1
1000 TABLET ORAL 2 TIMES DAILY
Qty: 180 TABLET | Refills: 1 | Status: SHIPPED | OUTPATIENT
Start: 2024-11-05

## 2024-11-05 RX ORDER — LEVOTHYROXINE SODIUM 25 UG/1
25 TABLET ORAL DAILY
Qty: 90 TABLET | Refills: 3 | Status: SHIPPED | OUTPATIENT
Start: 2024-11-05

## 2024-11-05 RX ORDER — METOPROLOL TARTRATE 25 MG/1
12.5 TABLET, FILM COATED ORAL 2 TIMES DAILY
Qty: 90 TABLET | Refills: 3 | Status: SHIPPED | OUTPATIENT
Start: 2024-11-05

## 2024-11-05 RX ORDER — ATORVASTATIN CALCIUM 40 MG/1
40 TABLET, FILM COATED ORAL DAILY
Qty: 90 TABLET | Refills: 3 | Status: SHIPPED | OUTPATIENT
Start: 2024-11-05

## 2024-11-05 ASSESSMENT — ENCOUNTER SYMPTOMS
CARDIOVASCULAR NEGATIVE: 1
HEMATOLOGIC/LYMPHATIC NEGATIVE: 1
RESPIRATORY NEGATIVE: 1
GASTROINTESTINAL NEGATIVE: 1
NEUROLOGICAL NEGATIVE: 1
ALLERGIC/IMMUNOLOGIC NEGATIVE: 1
CONSTITUTIONAL NEGATIVE: 1
MUSCULOSKELETAL NEGATIVE: 1
PSYCHIATRIC NEGATIVE: 1

## 2024-11-05 ASSESSMENT — PAIN SCALES - GENERAL: PAINLEVEL_OUTOF10: 0-NO PAIN

## 2024-11-05 NOTE — PROGRESS NOTES
"Subjective   Patient ID: Bola Marrufo is a 69 y.o. male who presents for Med Refill (Moving to [facility]).    HPI   Bola presents for medication refills- he is moving to [facility] to take care of his older sister and help her maintain her house. He states that he has his apartment packed up besides the necessities and plans to leave later this week.  He has no other concerns at this time.     Review of Systems   Constitutional: Negative.    HENT: Negative.     Respiratory: Negative.     Cardiovascular: Negative.    Gastrointestinal: Negative.    Genitourinary: Negative.    Musculoskeletal: Negative.    Allergic/Immunologic: Negative.    Neurological: Negative.    Hematological: Negative.    Psychiatric/Behavioral: Negative.         Objective   /80 (BP Location: Right arm, Patient Position: Sitting)   Pulse 70   Temp 36.6 °C (97.8 °F)   Resp 18   Ht 1.786 m (5' 10.3\")   Wt 88.9 kg (196 lb)   SpO2 98%   BMI 27.88 kg/m²     Physical Exam  HENT:      Right Ear: Tympanic membrane normal.      Left Ear: Tympanic membrane normal.   Cardiovascular:      Rate and Rhythm: Normal rate and regular rhythm.      Pulses: Normal pulses.      Heart sounds: Normal heart sounds.   Pulmonary:      Effort: Pulmonary effort is normal.      Breath sounds: Normal breath sounds.   Musculoskeletal:         General: Normal range of motion.   Skin:     General: Skin is warm and dry.      Capillary Refill: Capillary refill takes less than 2 seconds.   Neurological:      Mental Status: He is oriented to person, place, and time.   Psychiatric:         Mood and Affect: Mood normal.         Behavior: Behavior normal.         Thought Content: Thought content normal.         Judgment: Judgment normal.         Assessment/Plan   Problem List Items Addressed This Visit             ICD-10-CM    Anxiety F41.9    Benign essential hypertension I10    Relevant Medications    metoprolol tartrate (Lopressor) 25 mg tablet    Hyperlipidemia " E78.5    Relevant Medications    atorvastatin (Lipitor) 40 mg tablet    Hypothyroidism E03.9    Relevant Medications    levothyroxine (Synthroid, Levoxyl) 25 mcg tablet    Non-insulin dependent type 2 diabetes mellitus (Multi) E11.9    Relevant Medications    metFORMIN (Glucophage) 1,000 mg tablet     Other Visit Diagnoses         Codes    Current episode of major depressive disorder without prior episode, unspecified depression episode severity     F32.9

## 2024-11-18 ENCOUNTER — APPOINTMENT (OUTPATIENT)
Dept: PRIMARY CARE | Facility: CLINIC | Age: 69
End: 2024-11-18
Payer: MEDICARE

## 2025-04-17 ENCOUNTER — APPOINTMENT (OUTPATIENT)
Dept: CARDIOLOGY | Facility: CLINIC | Age: 70
End: 2025-04-17
Payer: MEDICARE